# Patient Record
Sex: MALE | Race: WHITE | NOT HISPANIC OR LATINO | Employment: OTHER | URBAN - METROPOLITAN AREA
[De-identification: names, ages, dates, MRNs, and addresses within clinical notes are randomized per-mention and may not be internally consistent; named-entity substitution may affect disease eponyms.]

---

## 2017-06-08 ENCOUNTER — OFFICE VISIT (OUTPATIENT)
Dept: PAIN MEDICINE | Facility: CLINIC | Age: 67
End: 2017-06-08

## 2017-06-08 VITALS
HEIGHT: 72 IN | HEART RATE: 67 BPM | RESPIRATION RATE: 18 BRPM | OXYGEN SATURATION: 97 % | SYSTOLIC BLOOD PRESSURE: 121 MMHG | DIASTOLIC BLOOD PRESSURE: 81 MMHG | BODY MASS INDEX: 34.4 KG/M2 | WEIGHT: 254 LBS | TEMPERATURE: 97.5 F

## 2017-06-08 DIAGNOSIS — E66.8 MODERATE OBESITY: ICD-10-CM

## 2017-06-08 DIAGNOSIS — E66.8 MODERATE OBESITY: Primary | ICD-10-CM

## 2017-06-08 DIAGNOSIS — M47.816 LUMBAR FACET ARTHROPATHY: ICD-10-CM

## 2017-06-08 DIAGNOSIS — R53.81 PHYSICAL DECONDITIONING: ICD-10-CM

## 2017-06-08 DIAGNOSIS — G47.00 INSOMNIA, UNSPECIFIED TYPE: ICD-10-CM

## 2017-06-08 DIAGNOSIS — Z86.69 HX OF MIGRAINE HEADACHES: ICD-10-CM

## 2017-06-08 DIAGNOSIS — I10 ESSENTIAL HYPERTENSION: ICD-10-CM

## 2017-06-08 DIAGNOSIS — M96.1 POSTLAMINECTOMY SYNDROME OF LUMBAR REGION: ICD-10-CM

## 2017-06-08 PROCEDURE — 95972 ALYS CPLX SP/PN NPGT W/PRGRM: CPT | Performed by: ANESTHESIOLOGY

## 2017-06-08 PROCEDURE — 99214 OFFICE O/P EST MOD 30 MIN: CPT | Performed by: ANESTHESIOLOGY

## 2017-06-08 RX ORDER — AMLODIPINE BESYLATE 10 MG/1
TABLET ORAL
Refills: 3 | COMMUNITY
Start: 2017-05-04

## 2017-06-08 RX ORDER — TRAZODONE HYDROCHLORIDE 50 MG/1
TABLET ORAL
Refills: 2 | COMMUNITY
Start: 2017-05-10

## 2017-06-08 RX ORDER — MELOXICAM 7.5 MG/1
TABLET ORAL
Refills: 0 | COMMUNITY
Start: 2017-03-06 | End: 2017-06-08

## 2017-06-08 RX ORDER — ZOLPIDEM TARTRATE 10 MG/1
TABLET ORAL
Refills: 1 | COMMUNITY
Start: 2017-05-05 | End: 2017-06-08

## 2017-06-08 NOTE — PROGRESS NOTES
"Chief Complaint: \"Minor pain in my lower back down my left leg.\"    Brief History: Mr. Williams Lora is a 66 y.o. male, who underwent implantation of a spinal cord stimulator device with Dr. Ratliff on 09/26/2016 with [employer] Penta paddle lead (Full Body MRI compatible)   with the top electrodes projecting at the level of the superior endplate of the T7 vertebral level. IPG: St. Bert Proclaim 5 IPG Non-Rechargeable (Full Body MRI compatible)  (MRI compatible full body). Patient remained afebrile and surgical wounds are well healed and without erythema, drainage, or fluid accumulation. The device was implanted primarily for treatment of lower back and bilateral lower extremity pain.  Patient returns to the clinic for evaluation of and possible spinal cord stimulator reprogramming.   Mr. Williams Lora reports 70% relief along with remarkable functional improvement with the use of his stimulator device. Patient reports significant relief of his previously severe neurogenic claudication.   Pain level is rated as 4/10 with the stimulator \"turned on.”  Patient level ranges from 2/10 to 8/10 (with extreme activities) with the use of the spinal cord stimulator device.    Patient denies  numbness and weakness in the lower extremities.   Patient denies  any new bladder or bowel problems.   In terms of current analgesics, Williams Lora takes: Percocet 7.5/325 PRN   I have reviewed [facility] Report #[id number] consistent to medication reconciliation.    Review of New Diagnostic Studies:    MRI THORACIC SPINE WO CONTRAST - 08/31/2016. IMPRESSION:  1. Mild superior endplate compression deformity of T6. No evidence of acute thoracic spine trauma or significant focal subluxation.  2. Multilevel posterior element hypertrophy, with moderate thoracic canal stenosis at least, at T10-11, mild canal stenosis at T9-10, and milder degenerative changes elsewhere as discussed above.  3. Generalized ectasia of the descending " "thoracic aorta, where visible, measuring between 3 and 3.5 cm. At some point, follow-up scanning of the chest and abdomen should be considered to evaluate for possible greater degree of dilatation or focal aneurysm.      Previous Diagnostic Studies:   MRI Lumbar w/o contrast, 01/19/2016:L1-L2, degenerative facet changes noted without canal or foraminal stenosis. L2-L3, mild annular bulging with facet lamina flavum hypertrophy. L3-L4, moderate annular bulging with moderate facet and ligamentum flavum hypertrophy. Mild central canal narrowing, moderate foraminal stenosis with displacement of the nerve root superiorly in the foramen. L4-L5, moderate foraminal narrowing. L5-S1 minimal and lateral annular bulging, moderate to advanced facet hypertrophy. Moderate to severe right and moderate left foraminal stenosis. No central canal stenosis.  X-ray Lumbar, 11/03/2015: No fractures identified. Posterior fusion at L4-L5 level with discectomy as noted. Degenerative spur formations identified throughout lumbar spine. Alignment is normal.     The following portions of the patient's history were reviewed and updated as appropriate: problem list, past medical history, past surgery history, social history, family history, medications, and allergies    Review of Systems   Musculoskeletal: Positive for back pain and myalgias.   All other systems reviewed and are negative.    /81 (BP Location: Left arm, Patient Position: Sitting)  Pulse 67  Temp 97.5 °F (36.4 °C)  Resp 18  Ht 72\" (182.9 cm)  Wt 254 lb (115 kg)  SpO2 97%  BMI 34.45 kg/m2      Physical Exam   Neurologic Exam  Constitutional: He is oriented to person, place, and time. Vital signs are normal. He appears well-developed and well-nourished.   HENT:    Head: Normocephalic and atraumatic.   Eyes: Conjunctivae and lids are normal.   Neck: Trachea normal. Neck supple. No JVD present.   Cardiovascular: Normal rate, regular rhythm and normal heart sounds. "    Pulses:  Femoral pulses are 2+ on the right side, and 1+ on the left side.  Dorsalis pedis pulses are 1+ on the right side, and 1+ on the left side.    Posterior tibial pulses are 1+ on the right side, and 1+ on the left side.   Pulmonary/Chest: Effort normal and breath sounds normal.   Abdominal: Soft. Normal appearance and bowel sounds are normal. There is no tenderness.   Musculoskeletal:   Right hip: He exhibits normal range of motion, normal strength and no tenderness.   Left hip: Normal. He exhibits normal range of motion, normal strength and no tenderness.   Lumbar back: He exhibits tenderness. He exhibits no pain. Decreased range of motion: facet maneuvers postive above and below fusion.   Mike and Gaenslen's tests are negative  Piriformis maneuvers are negative   Lymphadenopathy:   Right: No inguinal adenopathy present.   Left: No inguinal adenopathy present.   Neurological: He is alert and oriented to person, place, and time. He has normal strength. He displays no atrophy and no tremor. No cranial nerve deficit or sensory deficit. He exhibits normal muscle tone. He displays a negative Romberg sign. Coordination normal. He displays no Babinski's sign on the right side. He displays no Babinski's sign on the left side.    Reflex Scores:  Patellar reflexes are 1+ on the right side and 0 on the left side.  Achilles reflexes are 0 on the right side and 0 on the left side.  FSS, negative, SLR: Positive. Negative long track signs   Skin: Skin is warm and intact. No cyanosis. Both surgical wounds are well healed with complete epithelization and without erythema, drainage or fluid accumulation  Psychiatric: He has a normal mood and affect. His speech is normal and behavior is normal. Judgment and thought content normal. Cognition and memory are normal.      PROCEDURE: Analysis of the spinal cord stimulator device with complex spinal cord stimulator reprogramming   Patient used the [employer] Jude spinal cord  stimulator device for 5424 lifetime hours (average 24 hours per day). Analysis of impedence reveals normal impedence for all contacts. The spinal cord stimulator device was reprogrammed under my direct supervision and two programs were created by adjusting electrode polarities, amplitude, pulse width, pulse rate, BurstDR, for a total of 2 programs, in the following fashion;  Program ONE (Best Program):    Electrode polarities: 3+, 4-, 5+  Amplitude: 0.35-0.5 mA     Pulse width: 1000 mcs  Rate: 40 Hz  IntraBurst rate: 500 Hz  Micro-dosin:25     Patient experienced significant pain relief with coverage with pleasant paresthesia in all areas of chronic pain during tonic stimulation.  Time spent reprogramming: 15 minutes  A copy of the telemetry report will be scanned in the patient's chart.    ASSESSMENT:   1. Postlaminectomy syndrome of lumbar region    2. Lumbar facet arthropathy    3. Hx of migraine headaches    4. Moderate obesity    5. Insomnia, unspecified type    6. Essential hypertension    7. Physical deconditioning      PLAN:  Patient's chronic pain condition is significantly improved since implantation of his SCS device. Continue current management and any additional workup, referrals, and treatments as outlined in the following plan:  1. Follow-up in 12 weeks  2. Long-term rehabilitation efforts:  A. Start a comprehensive physical therapy program for reconditioning, water therapy, core strengthening, gait and balance training, neurodynamics, myofascial release, cupping and dry needling at [facility]  B. Referral to [facility]  C. Start independent exercise program such as daily walks for fitness  3.  Pharmacological measures: Patient takes oxycodone/APAP as needed prescribed by his PCP   4. The patient has been instructed to contact my office with any questions or difficulties. The patient understands the plan and agrees to proceed accordingly.       Patient Care  Team:  Juanis Carreon MD as PCP - General (Family Medicine)  Ramiro Cordova MD as Consulting Physician (Cardiology)  Dany Ratliff MD as Surgeon (Neurosurgery)  Glenroy Cramer PA-C as Physician Assistant (Neurosurgery)  Hiram Hair MD as Consulting Physician (Pain Medicine)     No orders of the defined types were placed in this encounter.        No future appointments.      Hiram Hair MD    EMR Dragon/Transcription disclaimer:  Much of this encounter note is an electronic transcription of spoken language to printed text. Electronic transcription of spoken language may permit erroneous, or at times, nonsensical words or phrases to be inadvertently transcribed. Although I have reviewed the note for such errors, some may still exist.

## 2017-06-13 ENCOUNTER — HOSPITAL ENCOUNTER (OUTPATIENT)
Dept: NUTRITION | Facility: HOSPITAL | Age: 67
Discharge: HOME | End: 2017-06-13
Admitting: ANESTHESIOLOGY

## 2017-06-13 PROCEDURE — 97802 MEDICAL NUTRITION INDIV IN: CPT

## 2017-06-28 ENCOUNTER — APPOINTMENT (OUTPATIENT)
Dept: CT IMAGING | Facility: HOSPITAL | Age: 67
End: 2017-06-28

## 2017-06-28 ENCOUNTER — HOSPITAL ENCOUNTER (EMERGENCY)
Facility: HOSPITAL | Age: 67
Discharge: HOME | End: 2017-06-28
Attending: EMERGENCY MEDICINE | Admitting: EMERGENCY MEDICINE

## 2017-06-28 ENCOUNTER — APPOINTMENT (OUTPATIENT)
Dept: GENERAL RADIOLOGY | Facility: HOSPITAL | Age: 67
End: 2017-06-28

## 2017-06-28 VITALS
SYSTOLIC BLOOD PRESSURE: 147 MMHG | RESPIRATION RATE: 16 BRPM | HEART RATE: 79 BPM | BODY MASS INDEX: 33.86 KG/M2 | HEIGHT: 72 IN | WEIGHT: 250 LBS | DIASTOLIC BLOOD PRESSURE: 96 MMHG | OXYGEN SATURATION: 95 % | TEMPERATURE: 98 F

## 2017-06-28 DIAGNOSIS — R07.9 CHEST PAIN, UNSPECIFIED: Primary | ICD-10-CM

## 2017-06-28 LAB
ALBUMIN SERPL-MCNC: 4.6 G/DL (ref 3.4–4.8)
ALBUMIN/GLOB SERPL: 1.5 G/DL (ref 1.5–2.5)
ALP SERPL-CCNC: 70 U/L (ref 40–129)
ALT SERPL W P-5'-P-CCNC: 45 U/L (ref 10–44)
ANION GAP SERPL CALCULATED.3IONS-SCNC: 3.2 MMOL/L (ref 3.6–11.2)
AST SERPL-CCNC: 36 U/L (ref 10–34)
BASOPHILS # BLD AUTO: 0.06 10*3/MM3 (ref 0–0.3)
BASOPHILS NFR BLD AUTO: 0.8 % (ref 0–2)
BILIRUB SERPL-MCNC: 0.5 MG/DL (ref 0.2–1.8)
BILIRUB UR QL STRIP: NEGATIVE
BUN BLD-MCNC: 15 MG/DL (ref 7–21)
BUN/CREAT SERPL: 12.1 (ref 7–25)
CALCIUM SPEC-SCNC: 10.1 MG/DL (ref 7.7–10)
CHLORIDE SERPL-SCNC: 111 MMOL/L (ref 99–112)
CK MB SERPL-CCNC: 0.93 NG/ML (ref 0–5)
CLARITY UR: CLEAR
CO2 SERPL-SCNC: 31.8 MMOL/L (ref 24.3–31.9)
COLOR UR: YELLOW
CREAT BLD-MCNC: 1.24 MG/DL (ref 0.43–1.29)
DEPRECATED RDW RBC AUTO: 41.3 FL (ref 37–54)
EOSINOPHIL # BLD AUTO: 0.17 10*3/MM3 (ref 0–0.7)
EOSINOPHIL NFR BLD AUTO: 2.2 % (ref 0–7)
ERYTHROCYTE [DISTWIDTH] IN BLOOD BY AUTOMATED COUNT: 13.2 % (ref 11.5–14.5)
GFR SERPL CREATININE-BSD FRML MDRD: 58 ML/MIN/1.73
GLOBULIN UR ELPH-MCNC: 3.1 GM/DL
GLUCOSE BLD-MCNC: 126 MG/DL (ref 70–110)
GLUCOSE UR STRIP-MCNC: NEGATIVE MG/DL
HCT VFR BLD AUTO: 43.2 % (ref 42–52)
HGB BLD-MCNC: 14.2 G/DL (ref 14–18)
HGB UR QL STRIP.AUTO: NEGATIVE
IMM GRANULOCYTES # BLD: 0.02 10*3/MM3 (ref 0–0.03)
IMM GRANULOCYTES NFR BLD: 0.3 % (ref 0–0.5)
KETONES UR QL STRIP: NEGATIVE
LEUKOCYTE ESTERASE UR QL STRIP.AUTO: NEGATIVE
LYMPHOCYTES # BLD AUTO: 1.99 10*3/MM3 (ref 1–3)
LYMPHOCYTES NFR BLD AUTO: 25.7 % (ref 16–46)
MCH RBC QN AUTO: 28.3 PG (ref 27–33)
MCHC RBC AUTO-ENTMCNC: 32.9 G/DL (ref 33–37)
MCV RBC AUTO: 86.1 FL (ref 80–94)
MONOCYTES # BLD AUTO: 1 10*3/MM3 (ref 0.1–0.9)
MONOCYTES NFR BLD AUTO: 12.9 % (ref 0–12)
MYOGLOBIN SERPL-MCNC: 55 NG/ML (ref 0–109)
NEUTROPHILS # BLD AUTO: 4.5 10*3/MM3 (ref 1.4–6.5)
NEUTROPHILS NFR BLD AUTO: 58.1 % (ref 40–75)
NITRITE UR QL STRIP: NEGATIVE
OSMOLALITY SERPL CALC.SUM OF ELEC: 292.9 MOSM/KG (ref 273–305)
PH UR STRIP.AUTO: 5.5 [PH] (ref 5–8)
PLATELET # BLD AUTO: 197 10*3/MM3 (ref 130–400)
PMV BLD AUTO: 10 FL (ref 6–10)
POTASSIUM BLD-SCNC: 4.5 MMOL/L (ref 3.5–5.3)
PROT SERPL-MCNC: 7.7 G/DL (ref 6–8)
PROT UR QL STRIP: NEGATIVE
RBC # BLD AUTO: 5.02 10*6/MM3 (ref 4.7–6.1)
SODIUM BLD-SCNC: 146 MMOL/L (ref 135–153)
SP GR UR STRIP: 1.02 (ref 1–1.03)
TROPONIN I SERPL-MCNC: <0.006 NG/ML
TROPONIN I SERPL-MCNC: <0.006 NG/ML
UROBILINOGEN UR QL STRIP: NORMAL
WBC NRBC COR # BLD: 7.74 10*3/MM3 (ref 4.5–12.5)

## 2017-06-28 PROCEDURE — 71010 XR CHEST 1 VW: CPT | Performed by: RADIOLOGY

## 2017-06-28 PROCEDURE — 82553 CREATINE MB FRACTION: CPT | Performed by: PHYSICIAN ASSISTANT

## 2017-06-28 PROCEDURE — 80053 COMPREHEN METABOLIC PANEL: CPT | Performed by: PHYSICIAN ASSISTANT

## 2017-06-28 PROCEDURE — 36415 COLL VENOUS BLD VENIPUNCTURE: CPT

## 2017-06-28 PROCEDURE — 93005 ELECTROCARDIOGRAM TRACING: CPT | Performed by: EMERGENCY MEDICINE

## 2017-06-28 PROCEDURE — 93010 ELECTROCARDIOGRAM REPORT: CPT | Performed by: INTERNAL MEDICINE

## 2017-06-28 PROCEDURE — 71010 HC CHEST PA OR AP: CPT

## 2017-06-28 PROCEDURE — 84484 ASSAY OF TROPONIN QUANT: CPT | Performed by: PHYSICIAN ASSISTANT

## 2017-06-28 PROCEDURE — 85025 COMPLETE CBC W/AUTO DIFF WBC: CPT | Performed by: PHYSICIAN ASSISTANT

## 2017-06-28 PROCEDURE — 83874 ASSAY OF MYOGLOBIN: CPT | Performed by: PHYSICIAN ASSISTANT

## 2017-06-28 PROCEDURE — 81003 URINALYSIS AUTO W/O SCOPE: CPT | Performed by: PHYSICIAN ASSISTANT

## 2017-06-28 PROCEDURE — 99285 EMERGENCY DEPT VISIT HI MDM: CPT

## 2017-06-28 PROCEDURE — 0 IOPAMIDOL PER 1 ML: Performed by: EMERGENCY MEDICINE

## 2017-06-28 PROCEDURE — 71275 CT ANGIOGRAPHY CHEST: CPT

## 2017-06-28 PROCEDURE — 71275 CT ANGIOGRAPHY CHEST: CPT | Performed by: RADIOLOGY

## 2017-06-28 RX ORDER — CHLORAL HYDRATE 500 MG
CAPSULE ORAL
COMMUNITY
End: 2017-09-28 | Stop reason: ALTCHOICE

## 2017-06-28 RX ORDER — ASPIRIN 81 MG/1
324 TABLET, CHEWABLE ORAL ONCE
Status: COMPLETED | OUTPATIENT
Start: 2017-06-28 | End: 2017-06-28

## 2017-06-28 RX ORDER — NITROGLYCERIN 0.4 MG/1
0.4 TABLET SUBLINGUAL
Status: COMPLETED | OUTPATIENT
Start: 2017-06-28 | End: 2017-06-28

## 2017-06-28 RX ORDER — SODIUM CHLORIDE 0.9 % (FLUSH) 0.9 %
10 SYRINGE (ML) INJECTION AS NEEDED
Status: DISCONTINUED | OUTPATIENT
Start: 2017-06-28 | End: 2017-06-29 | Stop reason: HOSPADM

## 2017-06-28 RX ADMIN — IOPAMIDOL 90 ML: 755 INJECTION, SOLUTION INTRAVENOUS at 19:53

## 2017-06-28 RX ADMIN — NITROGLYCERIN 0.4 MG: 0.4 TABLET SUBLINGUAL at 18:56

## 2017-06-28 RX ADMIN — NITROGLYCERIN 0.4 MG: 0.4 TABLET SUBLINGUAL at 19:01

## 2017-06-28 RX ADMIN — ASPIRIN 324 MG: 81 TABLET, CHEWABLE ORAL at 18:49

## 2017-06-28 RX ADMIN — NITROGLYCERIN 0.4 MG: 0.4 TABLET SUBLINGUAL at 18:51

## 2017-06-29 ENCOUNTER — TRANSCRIBE ORDERS (OUTPATIENT)
Dept: ADMINISTRATIVE | Facility: HOSPITAL | Age: 67
End: 2017-06-29

## 2017-06-29 DIAGNOSIS — R07.9 CHEST PAIN, UNSPECIFIED: Primary | ICD-10-CM

## 2017-06-30 ENCOUNTER — HOSPITAL ENCOUNTER (OUTPATIENT)
Dept: CARDIOLOGY | Facility: HOSPITAL | Age: 67
Discharge: HOME | End: 2017-06-30

## 2017-06-30 ENCOUNTER — HOSPITAL ENCOUNTER (EMERGENCY)
Facility: HOSPITAL | Age: 67
Discharge: HOME | End: 2017-06-30
Attending: EMERGENCY MEDICINE | Admitting: EMERGENCY MEDICINE

## 2017-06-30 ENCOUNTER — APPOINTMENT (OUTPATIENT)
Dept: CT IMAGING | Facility: HOSPITAL | Age: 67
End: 2017-06-30

## 2017-06-30 VITALS
WEIGHT: 255 LBS | DIASTOLIC BLOOD PRESSURE: 98 MMHG | OXYGEN SATURATION: 99 % | HEIGHT: 72 IN | TEMPERATURE: 98 F | BODY MASS INDEX: 34.54 KG/M2 | SYSTOLIC BLOOD PRESSURE: 146 MMHG | HEART RATE: 99 BPM | RESPIRATION RATE: 16 BRPM

## 2017-06-30 DIAGNOSIS — M50.30 DDD (DEGENERATIVE DISC DISEASE), CERVICAL: Primary | ICD-10-CM

## 2017-06-30 DIAGNOSIS — R07.9 CHEST PAIN, UNSPECIFIED: ICD-10-CM

## 2017-06-30 DIAGNOSIS — M54.2 NECK PAIN ON LEFT SIDE: ICD-10-CM

## 2017-06-30 DIAGNOSIS — M25.512 ACUTE PAIN OF LEFT SHOULDER: ICD-10-CM

## 2017-06-30 LAB — TROPONIN I SERPL-MCNC: <0.006 NG/ML

## 2017-06-30 PROCEDURE — 72125 CT NECK SPINE W/O DYE: CPT | Performed by: RADIOLOGY

## 2017-06-30 PROCEDURE — 99283 EMERGENCY DEPT VISIT LOW MDM: CPT

## 2017-06-30 PROCEDURE — 93010 ELECTROCARDIOGRAM REPORT: CPT | Performed by: INTERNAL MEDICINE

## 2017-06-30 PROCEDURE — 84484 ASSAY OF TROPONIN QUANT: CPT | Performed by: EMERGENCY MEDICINE

## 2017-06-30 PROCEDURE — 72125 CT NECK SPINE W/O DYE: CPT

## 2017-06-30 PROCEDURE — 93005 ELECTROCARDIOGRAM TRACING: CPT | Performed by: EMERGENCY MEDICINE

## 2017-06-30 RX ORDER — OXYCODONE AND ACETAMINOPHEN 10; 325 MG/1; MG/1
1 TABLET ORAL ONCE
Status: COMPLETED | OUTPATIENT
Start: 2017-06-30 | End: 2017-06-30

## 2017-06-30 RX ORDER — OXYCODONE HYDROCHLORIDE AND ACETAMINOPHEN 5; 325 MG/1; MG/1
1 TABLET ORAL EVERY 8 HOURS PRN
Qty: 10 TABLET | Refills: 0 | Status: SHIPPED | OUTPATIENT
Start: 2017-06-30 | End: 2017-09-28 | Stop reason: ALTCHOICE

## 2017-06-30 RX ORDER — ONDANSETRON 4 MG/1
4 TABLET, ORALLY DISINTEGRATING ORAL ONCE
Status: COMPLETED | OUTPATIENT
Start: 2017-06-30 | End: 2017-06-30

## 2017-06-30 RX ORDER — SODIUM CHLORIDE 0.9 % (FLUSH) 0.9 %
10 SYRINGE (ML) INJECTION AS NEEDED
Status: DISCONTINUED | OUTPATIENT
Start: 2017-06-30 | End: 2017-06-30 | Stop reason: HOSPADM

## 2017-06-30 RX ORDER — CYCLOBENZAPRINE HCL 10 MG
10 TABLET ORAL 3 TIMES DAILY PRN
Qty: 20 TABLET | Refills: 0 | Status: SHIPPED | OUTPATIENT
Start: 2017-06-30 | End: 2017-09-28 | Stop reason: ALTCHOICE

## 2017-06-30 RX ADMIN — OXYCODONE HYDROCHLORIDE AND ACETAMINOPHEN 1 TABLET: 10; 325 TABLET ORAL at 16:45

## 2017-06-30 RX ADMIN — ONDANSETRON 4 MG: 4 TABLET, ORALLY DISINTEGRATING ORAL at 16:45

## 2017-07-01 NOTE — ED PROVIDER NOTES
Subjective   Patient is a 67 y.o. male presenting with neck pain.   Neck Pain   Pain location:  L side  Quality:  Aching  Pain radiates to:  L shoulder and L arm  Pain severity:  Severe  Pain is:  Same all the time  Onset quality:  Gradual  Timing:  Constant  Progression:  Worsening  Chronicity:  New  Context: not fall, not jumping from heights, not lifting a heavy object, not MCA, not MVC, not pedestrian accident and not recent injury    Relieved by:  Nothing  Worsened by:  Position  Ineffective treatments:  None tried  Associated symptoms: no bladder incontinence, no bowel incontinence, no chest pain, no fever, no headaches, no leg pain, no numbness, no paresis, no photophobia, no syncope, no tingling, no visual change, no weakness and no weight loss    Risk factors: no hx of head and neck radiation, no hx of osteoporosis and no hx of spinal trauma        Review of Systems   Constitutional: Negative for activity change, appetite change, chills, diaphoresis, fatigue, fever and weight loss.   HENT: Negative for congestion, ear pain and sore throat.    Eyes: Negative for photophobia and redness.   Respiratory: Negative for cough, chest tightness, shortness of breath and wheezing.    Cardiovascular: Negative for chest pain, palpitations, leg swelling and syncope.   Gastrointestinal: Negative for abdominal pain, bowel incontinence, diarrhea, nausea and vomiting.   Genitourinary: Negative for bladder incontinence, dysuria and urgency.   Musculoskeletal: Positive for myalgias and neck pain. Negative for arthralgias and back pain.   Skin: Negative for pallor, rash and wound.   Neurological: Negative for dizziness, tingling, speech difficulty, weakness, numbness and headaches.   Psychiatric/Behavioral: Negative for agitation, behavioral problems, confusion and decreased concentration.       Past Medical History:   Diagnosis Date   • Arthritis    • Chronic pain disorder    • Depression     RECENT DEATH OF DAUGHTER   •  Extremity pain    • Grief     Sudden loss of daughter in law 4 weeks ago   • Headache    • History of gastritis    • History of gout    • History of kidney stones    • Hypertension    • Low back pain    • Lumbosacral disc disease    • Migraines    • Rheumatic fever    • Wears glasses        No Known Allergies    Past Surgical History:   Procedure Laterality Date   • CHOLECYSTECTOMY  2001   • COLONOSCOPY     • CYSTOSCOPY W/ LITHOLAPAXY / E[facility]  2004   • LUMBAR FUSION  10/2013    posterior lumbar interbody fusion, L4-L5, Dr. Raad Rivera   • SPINAL CORD STIMULATOR IMPLANT  07/18/2016   • SPINAL CORD STIMULATOR IMPLANT N/A 9/26/2016    Procedure: SPINAL CORD STIMULATOR INSERTION PHASE 1;  Surgeon: Dany Ratliff MD;  Location: [facility];  Service:        Family History   Problem Relation Age of Onset   • Heart disease Mother    • Pulmonary fibrosis Father        Social History     Social History   • Marital status:      Spouse name: N/A   • Number of children: N/A   • Years of education: N/A     Social History Main Topics   • Smoking status: Former Smoker     Years: 20.00     Types: Cigarettes     Quit date: 8/19/1996   • Smokeless tobacco: Never Used   • Alcohol use No      Comment: Rarely   • Drug use: No   • Sexual activity: Defer     Other Topics Concern   • None     Social History Narrative           Objective   Physical Exam   Constitutional: He is oriented to person, place, and time. He appears well-developed and well-nourished.  Non-toxic appearance. No distress.   HENT:   Head: Normocephalic and atraumatic.   Right Ear: External ear normal.   Left Ear: External ear normal.   Nose: Nose normal.   Mouth/Throat: Oropharynx is clear and moist and mucous membranes are normal. No oropharyngeal exudate. No tonsillar exudate.   Eyes: Conjunctivae, EOM and lids are normal. Pupils are equal, round, and reactive to light.   Neck: Normal range of motion and full passive range of motion without pain. Neck supple. No  thyromegaly present.   Cardiovascular: Normal rate, regular rhythm, S1 normal, S2 normal, normal heart sounds, intact distal pulses and normal pulses.    Pulmonary/Chest: Effort normal and breath sounds normal. No tachypnea. No respiratory distress. He has no decreased breath sounds. He has no wheezes. He has no rales. He exhibits no tenderness.   Abdominal: Soft. Normal appearance and bowel sounds are normal. He exhibits no distension. There is no tenderness. There is no rebound and no guarding.   Musculoskeletal: He exhibits tenderness. He exhibits no edema or deformity.        Left shoulder: He exhibits decreased range of motion, tenderness and bony tenderness.        Cervical back: He exhibits tenderness.   Lymphadenopathy:     He has no cervical adenopathy.   Neurological: He is alert and oriented to person, place, and time. He has normal strength. No cranial nerve deficit or sensory deficit. GCS eye subscore is 4. GCS verbal subscore is 5. GCS motor subscore is 6.   Skin: Skin is warm, dry and intact. No rash noted. He is not diaphoretic. No erythema. No pallor.   Psychiatric: He has a normal mood and affect. His speech is normal and behavior is normal. Judgment and thought content normal. Cognition and memory are normal.   Nursing note and vitals reviewed.      Procedures         ED Course  ED Course   Value Comment By Time   CT Cervical Spine Without Contrast IMPRESSION:  There is arthritic change, but no acute bony abnormality is seen Juan A Black MD 06/30 1619   ECG 12 Lead Normal Sinus Rhythm.  No Acute Ischemia. Juan A Black MD 07/01 0031                  MDM  Number of Diagnoses or Management Options  Acute pain of left shoulder: new and requires workup  DDD (degenerative disc disease), cervical: new and requires workup  Neck pain on left side: new and requires workup     Amount and/or Complexity of Data Reviewed  Tests in the radiology section of CPT®: ordered and  reviewed  Independent visualization of images, tracings, or specimens: yes    Risk of Complications, Morbidity, and/or Mortality  Presenting problems: moderate  Diagnostic procedures: moderate  Management options: moderate    Patient Progress  Patient progress: stable      Final diagnoses:   Neck pain on left side   Acute pain of left shoulder   DDD (degenerative disc disease), cervical            Juan A Terrance Black MD  07/01/17 0035

## 2017-08-08 ENCOUNTER — APPOINTMENT (OUTPATIENT)
Dept: NUCLEAR MEDICINE | Facility: HOSPITAL | Age: 67
End: 2017-08-08

## 2017-08-08 ENCOUNTER — HOSPITAL ENCOUNTER (EMERGENCY)
Facility: HOSPITAL | Age: 67
Discharge: HOME | End: 2017-08-09
Attending: EMERGENCY MEDICINE | Admitting: EMERGENCY MEDICINE

## 2017-08-08 ENCOUNTER — APPOINTMENT (OUTPATIENT)
Dept: GENERAL RADIOLOGY | Facility: HOSPITAL | Age: 67
End: 2017-08-08

## 2017-08-08 DIAGNOSIS — T50.905A MEDICATION REACTION, INITIAL ENCOUNTER: Primary | ICD-10-CM

## 2017-08-08 DIAGNOSIS — J20.9 ACUTE BRONCHITIS, UNSPECIFIED ORGANISM: ICD-10-CM

## 2017-08-08 LAB
6-ACETYL MORPHINE: NEGATIVE
ALBUMIN SERPL-MCNC: 4.6 G/DL (ref 3.4–4.8)
ALBUMIN/GLOB SERPL: 1.3 G/DL (ref 1.5–2.5)
ALP SERPL-CCNC: 62 U/L (ref 40–129)
ALT SERPL W P-5'-P-CCNC: 32 U/L (ref 10–44)
AMPHET+METHAMPHET UR QL: NEGATIVE
AMYLASE SERPL-CCNC: 936 U/L (ref 28–100)
ANION GAP SERPL CALCULATED.3IONS-SCNC: 9.2 MMOL/L (ref 3.6–11.2)
AST SERPL-CCNC: 29 U/L (ref 10–34)
BACTERIA UR QL AUTO: ABNORMAL /HPF
BARBITURATES UR QL SCN: NEGATIVE
BASOPHILS # BLD AUTO: 0.03 10*3/MM3 (ref 0–0.3)
BASOPHILS NFR BLD AUTO: 0.3 % (ref 0–2)
BENZODIAZ UR QL SCN: NEGATIVE
BILIRUB SERPL-MCNC: 0.6 MG/DL (ref 0.2–1.8)
BILIRUB UR QL STRIP: NEGATIVE
BUN BLD-MCNC: 13 MG/DL (ref 7–21)
BUN/CREAT SERPL: 9.4 (ref 7–25)
BUPRENORPHINE SERPL-MCNC: NEGATIVE NG/ML
CALCIUM SPEC-SCNC: 10.4 MG/DL (ref 7.7–10)
CANNABINOIDS SERPL QL: NEGATIVE
CHLORIDE SERPL-SCNC: 107 MMOL/L (ref 99–112)
CLARITY UR: CLEAR
CO2 SERPL-SCNC: 22.8 MMOL/L (ref 24.3–31.9)
COCAINE UR QL: NEGATIVE
COLOR UR: YELLOW
CREAT BLD-MCNC: 1.39 MG/DL (ref 0.43–1.29)
D DIMER PPP FEU-MCNC: 0.69 MCGFEU/ML (ref 0–0.5)
DEPRECATED RDW RBC AUTO: 40.2 FL (ref 37–54)
EOSINOPHIL # BLD AUTO: 0.14 10*3/MM3 (ref 0–0.7)
EOSINOPHIL NFR BLD AUTO: 1.5 % (ref 0–7)
ERYTHROCYTE [DISTWIDTH] IN BLOOD BY AUTOMATED COUNT: 13 % (ref 11.5–14.5)
GFR SERPL CREATININE-BSD FRML MDRD: 51 ML/MIN/1.73
GLOBULIN UR ELPH-MCNC: 3.5 GM/DL
GLUCOSE BLD-MCNC: 203 MG/DL (ref 70–110)
GLUCOSE UR STRIP-MCNC: NEGATIVE MG/DL
HCT VFR BLD AUTO: 44.9 % (ref 42–52)
HGB BLD-MCNC: 15.1 G/DL (ref 14–18)
HGB UR QL STRIP.AUTO: ABNORMAL
HYALINE CASTS UR QL AUTO: ABNORMAL /LPF
IMM GRANULOCYTES # BLD: 0.02 10*3/MM3 (ref 0–0.03)
IMM GRANULOCYTES NFR BLD: 0.2 % (ref 0–0.5)
KETONES UR QL STRIP: NEGATIVE
LEUKOCYTE ESTERASE UR QL STRIP.AUTO: NEGATIVE
LIPASE SERPL-CCNC: 20 U/L (ref 13–60)
LYMPHOCYTES # BLD AUTO: 2.18 10*3/MM3 (ref 1–3)
LYMPHOCYTES NFR BLD AUTO: 23.4 % (ref 16–46)
MCH RBC QN AUTO: 28.4 PG (ref 27–33)
MCHC RBC AUTO-ENTMCNC: 33.6 G/DL (ref 33–37)
MCV RBC AUTO: 84.4 FL (ref 80–94)
METHADONE UR QL SCN: NEGATIVE
MONOCYTES # BLD AUTO: 0.78 10*3/MM3 (ref 0.1–0.9)
MONOCYTES NFR BLD AUTO: 8.4 % (ref 0–12)
NEUTROPHILS # BLD AUTO: 6.16 10*3/MM3 (ref 1.4–6.5)
NEUTROPHILS NFR BLD AUTO: 66.2 % (ref 40–75)
NITRITE UR QL STRIP: NEGATIVE
OPIATES UR QL: NEGATIVE
OSMOLALITY SERPL CALC.SUM OF ELEC: 283.5 MOSM/KG (ref 273–305)
OXYCODONE UR QL SCN: POSITIVE
PCP UR QL SCN: NEGATIVE
PH UR STRIP.AUTO: 6.5 [PH] (ref 5–8)
PLATELET # BLD AUTO: 218 10*3/MM3 (ref 130–400)
PMV BLD AUTO: 10.5 FL (ref 6–10)
POTASSIUM BLD-SCNC: 3.8 MMOL/L (ref 3.5–5.3)
PROT SERPL-MCNC: 8.1 G/DL (ref 6–8)
PROT UR QL STRIP: NEGATIVE
RBC # BLD AUTO: 5.32 10*6/MM3 (ref 4.7–6.1)
RBC # UR: ABNORMAL /HPF
REF LAB TEST METHOD: ABNORMAL
SODIUM BLD-SCNC: 139 MMOL/L (ref 135–153)
SP GR UR STRIP: 1.02 (ref 1–1.03)
SQUAMOUS #/AREA URNS HPF: ABNORMAL /HPF
TROPONIN I SERPL-MCNC: <0.006 NG/ML
UROBILINOGEN UR QL STRIP: ABNORMAL
WBC NRBC COR # BLD: 9.31 10*3/MM3 (ref 4.5–12.5)
WBC UR QL AUTO: ABNORMAL /HPF

## 2017-08-08 PROCEDURE — 80307 DRUG TEST PRSMV CHEM ANLYZR: CPT | Performed by: PHYSICIAN ASSISTANT

## 2017-08-08 PROCEDURE — 25010000002 ONDANSETRON PER 1 MG: Performed by: PHYSICIAN ASSISTANT

## 2017-08-08 PROCEDURE — 96374 THER/PROPH/DIAG INJ IV PUSH: CPT

## 2017-08-08 PROCEDURE — 94640 AIRWAY INHALATION TREATMENT: CPT

## 2017-08-08 PROCEDURE — 93005 ELECTROCARDIOGRAM TRACING: CPT | Performed by: EMERGENCY MEDICINE

## 2017-08-08 PROCEDURE — 78582 LUNG VENTILAT&PERFUS IMAGING: CPT

## 2017-08-08 PROCEDURE — 71010 XR CHEST 1 VW: CPT | Performed by: RADIOLOGY

## 2017-08-08 PROCEDURE — 94799 UNLISTED PULMONARY SVC/PX: CPT

## 2017-08-08 PROCEDURE — 82150 ASSAY OF AMYLASE: CPT | Performed by: PHYSICIAN ASSISTANT

## 2017-08-08 PROCEDURE — 96375 TX/PRO/DX INJ NEW DRUG ADDON: CPT

## 2017-08-08 PROCEDURE — 99283 EMERGENCY DEPT VISIT LOW MDM: CPT

## 2017-08-08 PROCEDURE — 71010 HC CHEST PA OR AP: CPT

## 2017-08-08 PROCEDURE — 78582 LUNG VENTILAT&PERFUS IMAGING: CPT | Performed by: RADIOLOGY

## 2017-08-08 PROCEDURE — 80053 COMPREHEN METABOLIC PANEL: CPT | Performed by: PHYSICIAN ASSISTANT

## 2017-08-08 PROCEDURE — 83690 ASSAY OF LIPASE: CPT | Performed by: PHYSICIAN ASSISTANT

## 2017-08-08 PROCEDURE — 84484 ASSAY OF TROPONIN QUANT: CPT | Performed by: PHYSICIAN ASSISTANT

## 2017-08-08 PROCEDURE — 25010000002 METHYLPREDNISOLONE PER 125 MG: Performed by: PHYSICIAN ASSISTANT

## 2017-08-08 PROCEDURE — 85379 FIBRIN DEGRADATION QUANT: CPT | Performed by: PHYSICIAN ASSISTANT

## 2017-08-08 PROCEDURE — 93010 ELECTROCARDIOGRAM REPORT: CPT | Performed by: INTERNAL MEDICINE

## 2017-08-08 PROCEDURE — 81001 URINALYSIS AUTO W/SCOPE: CPT | Performed by: PHYSICIAN ASSISTANT

## 2017-08-08 PROCEDURE — 85025 COMPLETE CBC W/AUTO DIFF WBC: CPT | Performed by: PHYSICIAN ASSISTANT

## 2017-08-08 RX ORDER — IPRATROPIUM BROMIDE AND ALBUTEROL SULFATE 2.5; .5 MG/3ML; MG/3ML
3 SOLUTION RESPIRATORY (INHALATION) ONCE
Status: COMPLETED | OUTPATIENT
Start: 2017-08-08 | End: 2017-08-08

## 2017-08-08 RX ORDER — METHYLPREDNISOLONE SODIUM SUCCINATE 125 MG/2ML
125 INJECTION, POWDER, LYOPHILIZED, FOR SOLUTION INTRAMUSCULAR; INTRAVENOUS ONCE
Status: COMPLETED | OUTPATIENT
Start: 2017-08-08 | End: 2017-08-08

## 2017-08-08 RX ORDER — ONDANSETRON 2 MG/ML
4 INJECTION INTRAMUSCULAR; INTRAVENOUS ONCE
Status: COMPLETED | OUTPATIENT
Start: 2017-08-08 | End: 2017-08-08

## 2017-08-08 RX ORDER — SODIUM CHLORIDE 0.9 % (FLUSH) 0.9 %
10 SYRINGE (ML) INJECTION AS NEEDED
Status: DISCONTINUED | OUTPATIENT
Start: 2017-08-08 | End: 2017-08-09 | Stop reason: HOSPADM

## 2017-08-08 RX ADMIN — METHYLPREDNISOLONE SODIUM SUCCINATE 125 MG: 125 INJECTION, POWDER, FOR SOLUTION INTRAMUSCULAR; INTRAVENOUS at 22:46

## 2017-08-08 RX ADMIN — IPRATROPIUM BROMIDE AND ALBUTEROL SULFATE 3 ML: .5; 3 SOLUTION RESPIRATORY (INHALATION) at 22:48

## 2017-08-08 RX ADMIN — ONDANSETRON 4 MG: 2 INJECTION, SOLUTION INTRAMUSCULAR; INTRAVENOUS at 22:53

## 2017-08-08 RX ADMIN — SODIUM CHLORIDE 500 ML: 9 INJECTION, SOLUTION INTRAVENOUS at 22:46

## 2017-08-09 VITALS
OXYGEN SATURATION: 97 % | HEART RATE: 81 BPM | DIASTOLIC BLOOD PRESSURE: 74 MMHG | WEIGHT: 254 LBS | RESPIRATION RATE: 18 BRPM | SYSTOLIC BLOOD PRESSURE: 134 MMHG | HEIGHT: 72 IN | BODY MASS INDEX: 34.4 KG/M2 | TEMPERATURE: 97.8 F

## 2017-08-09 PROCEDURE — 0 TECHNETIUM TC 99M PENTETATE KIT: Performed by: EMERGENCY MEDICINE

## 2017-08-09 PROCEDURE — 0 TECHNETIUM ALBUMIN AGGREGATED: Performed by: EMERGENCY MEDICINE

## 2017-08-09 PROCEDURE — 96375 TX/PRO/DX INJ NEW DRUG ADDON: CPT

## 2017-08-09 PROCEDURE — A9539 TC99M PENTETATE: HCPCS | Performed by: EMERGENCY MEDICINE

## 2017-08-09 PROCEDURE — A9540 TC99M MAA: HCPCS | Performed by: EMERGENCY MEDICINE

## 2017-08-09 RX ORDER — AMOXICILLIN AND CLAVULANATE POTASSIUM 875; 125 MG/1; MG/1
1 TABLET, FILM COATED ORAL 2 TIMES DAILY
Qty: 14 TABLET | Refills: 0 | Status: SHIPPED | OUTPATIENT
Start: 2017-08-09 | End: 2017-08-16

## 2017-08-09 RX ORDER — ALBUTEROL SULFATE 90 UG/1
2 AEROSOL, METERED RESPIRATORY (INHALATION) EVERY 6 HOURS PRN
Qty: 6.7 G | Refills: 0 | Status: SHIPPED | OUTPATIENT
Start: 2017-08-09 | End: 2017-08-12

## 2017-08-09 RX ORDER — ONDANSETRON 4 MG/1
4 TABLET, FILM COATED ORAL EVERY 8 HOURS PRN
Qty: 15 TABLET | Refills: 0 | Status: SHIPPED | OUTPATIENT
Start: 2017-08-09 | End: 2017-08-14

## 2017-08-09 RX ADMIN — Medication 1 DOSE: at 00:15

## 2017-08-09 RX ADMIN — KIT FOR THE PREPARATION OF TECHNETIUM TC 99M PENTETATE 1 DOSE: 20 INJECTION, POWDER, LYOPHILIZED, FOR SOLUTION INTRAVENOUS; RESPIRATORY (INHALATION) at 00:01

## 2017-08-09 NOTE — ED PROVIDER NOTES
Subjective   HPI Comments: This is a 67-year-old male patient who presents to the ER with a chief complaint of shortness of breath.  The patient says and his most recent medical history he is been dealing with an episode of shingles ×6 weeks.  Last night before going to bed he took 2-3 of his Percocet that he is prescribed for his chronic back pain, the Lyrica 150 mg prescribed for his shingles, and 3 trazodone for insomnia. He states that approximately 1.5 hours after taking those, he woke up from sleep experiencing shortness of breath and nausea. He battled that all night and day. Around dinner today, it improved but afterwards things worsened so he decided to come in. He denies chest pain, palpitations, vomiting, chest pain with deep breathing. He does endorse a non-productive cough and chills but no subjective fever. He has a history of smoking but quit 21 years ago. He has no cardiac or pulmonary history.     Patient is a 67 y.o. male presenting with shortness of breath.   Shortness of Breath   Severity:  Moderate  Onset quality:  Gradual  Duration:  12 hours  Timing:  Intermittent  Progression:  Worsening  Chronicity:  New  Context: activity    Relieved by:  None tried  Worsened by:  Activity, coughing, deep breathing, exertion and movement  Ineffective treatments:  None tried  Associated symptoms: cough    Associated symptoms: no abdominal pain, no chest pain, no diaphoresis, no ear pain, no fever, no headaches, no sore throat, no sputum production, no syncope, no vomiting and no wheezing    Risk factors: obesity        Review of Systems   Constitutional: Negative.  Negative for diaphoresis and fever.   HENT: Negative.  Negative for ear pain and sore throat.    Respiratory: Positive for cough and shortness of breath. Negative for sputum production and wheezing.    Cardiovascular: Negative.  Negative for chest pain, palpitations, leg swelling and syncope.   Gastrointestinal: Positive for nausea. Negative for  abdominal pain, constipation, diarrhea and vomiting.   Endocrine: Negative.    Genitourinary: Negative.  Negative for dysuria, frequency and urgency.   Skin: Negative.    Neurological: Negative.  Negative for dizziness, syncope, facial asymmetry, speech difficulty, weakness and headaches.   Psychiatric/Behavioral: Negative.    All other systems reviewed and are negative.      Past Medical History:   Diagnosis Date   • Arthritis    • Chronic pain disorder    • Depression     RECENT DEATH OF DAUGHTER   • Extremity pain    • Grief     Sudden loss of daughter in law 4 weeks ago   • Headache    • History of gastritis    • History of gout    • History of kidney stones    • Hypertension    • Low back pain    • Lumbosacral disc disease    • Migraines    • Rheumatic fever    • Wears glasses        No Known Allergies    Past Surgical History:   Procedure Laterality Date   • CHOLECYSTECTOMY  2001   • COLONOSCOPY     • CYSTOSCOPY W/ LITHOLAPAXY / EHL  2004   • LUMBAR FUSION  10/2013    posterior lumbar interbody fusion, L4-L5, Dr. Raad Rivera   • SPINAL CORD STIMULATOR IMPLANT  07/18/2016   • SPINAL CORD STIMULATOR IMPLANT N/A 9/26/2016    Procedure: SPINAL CORD STIMULATOR INSERTION PHASE 1;  Surgeon: Dany Ratliff MD;  Location: [facility];  Service:        Family History   Problem Relation Age of Onset   • Heart disease Mother    • Pulmonary fibrosis Father        Social History     Social History   • Marital status:      Spouse name: N/A   • Number of children: N/A   • Years of education: N/A     Social History Main Topics   • Smoking status: Former Smoker     Years: 20.00     Types: Cigarettes     Quit date: 8/19/1996   • Smokeless tobacco: Never Used   • Alcohol use No      Comment: Rarely   • Drug use: No   • Sexual activity: Defer     Other Topics Concern   • None     Social History Narrative           Objective   Physical Exam   Constitutional: He is oriented to person, place, and time. He appears  well-developed and well-nourished. No distress.   HENT:   Head: Normocephalic and atraumatic.   Right Ear: External ear normal.   Left Ear: External ear normal.   Nose: Nose normal.   Eyes: Conjunctivae and EOM are normal. Pupils are equal, round, and reactive to light.   Neck: Normal range of motion. Neck supple. No JVD present. No tracheal deviation present.   Cardiovascular: Normal rate, regular rhythm and normal heart sounds.    No murmur heard.  Pulmonary/Chest: Effort normal and breath sounds normal. No respiratory distress. He has no wheezes.   Abdominal: Soft. Bowel sounds are normal. There is no tenderness.   Musculoskeletal: Normal range of motion. He exhibits no edema or deformity.   Neurological: He is alert and oriented to person, place, and time. No cranial nerve deficit.   Skin: Skin is warm and dry. No rash noted. He is not diaphoretic. No erythema. No pallor.   Psychiatric: He has a normal mood and affect. His behavior is normal. Thought content normal.   Nursing note and vitals reviewed.      Procedures         ED Course  ED Course   Value Comment By Time   ECG 12 Lead Sinus tachycardia; otherwise no acute changes per BASSEM Maxwell 08/08 2219   XR Chest 1 View No acute cardio/pulm findings per Dr. Ellison. BASSEM Donovan 08/08 2318   NM Lung Ventilation Perfusion No evidence of PE per [facility] report. BASSEM Donovan 08/09 0118    Patient diagnosed with acute bronchitis. Will be d/c home with antibiotic, zofran and inhaler prescriptions. Will not take percocet with trazodone and lyrica at night. Will f/u with Dr. Carreon in 1 week or return to ER if symptoms worsen. Previously discussed elevated amylase with Dr. Nunes and with the patient. Since he is not symptomatic, we will have his PCP follow up with labs on this issue. Patient is aware of the need for this. BASSEM Donovan 08/09 0126                  MDM  Number of Diagnoses or Management Options     Amount and/or  Complexity of Data Reviewed  Clinical lab tests: reviewed and ordered  Tests in the radiology section of CPT®: reviewed and ordered  Tests in the medicine section of CPT®: reviewed        Final diagnoses:   Medication reaction, initial encounter   Acute bronchitis, unspecified organism            BASSEM Donovan  08/09/17 0131       BASSEM Donovan  08/09/17 0254       BASSEM Donovan  08/17/17 0813

## 2017-08-09 NOTE — DISCHARGE INSTRUCTIONS
Please start and finish augmentin and use zofran as needed for nausea. Please use inhaler as needed for shortness of breath. Please follow up with your PCP in 1 week or return to ER if symptoms worsen.    Hypertension  Hypertension, commonly called high blood pressure, is when the force of blood pumping through your arteries is too strong. Your arteries are the blood vessels that carry blood from your heart throughout your body. A blood pressure reading consists of a higher number over a lower number, such as 110/72. The higher number (systolic) is the pressure inside your arteries when your heart pumps. The lower number (diastolic) is the pressure inside your arteries when your heart relaxes. Ideally you want your blood pressure below 120/80.  Hypertension forces your heart to work harder to pump blood. Your arteries may become narrow or stiff. Having untreated or uncontrolled hypertension can cause heart attack, stroke, kidney disease, and other problems.  RISK FACTORS  Some risk factors for high blood pressure are controllable. Others are not.   Risk factors you cannot control include:   · Race. You may be at higher risk if you are .  · Age. Risk increases with age.  · Gender. Men are at higher risk than women before age 45 years. After age 65, women are at higher risk than men.  Risk factors you can control include:  · Not getting enough exercise or physical activity.  · Being overweight.  · Getting too much fat, sugar, calories, or salt in your diet.  · Drinking too much alcohol.  SIGNS AND SYMPTOMS  Hypertension does not usually cause signs or symptoms. Extremely high blood pressure (hypertensive crisis) may cause headache, anxiety, shortness of breath, and nosebleed.  DIAGNOSIS  To check if you have hypertension, your health care provider will measure your blood pressure while you are seated, with your arm held at the level of your heart. It should be measured at least twice using the same  arm. Certain conditions can cause a difference in blood pressure between your right and left arms. A blood pressure reading that is higher than normal on one occasion does not mean that you need treatment. If it is not clear whether you have high blood pressure, you may be asked to return on a different day to have your blood pressure checked again. Or, you may be asked to monitor your blood pressure at home for 1 or more weeks.  TREATMENT  Treating high blood pressure includes making lifestyle changes and possibly taking medicine. Living a healthy lifestyle can help lower high blood pressure. You may need to change some of your habits.  Lifestyle changes may include:  · Following the DASH diet. This diet is high in fruits, vegetables, and whole grains. It is low in salt, red meat, and added sugars.  · Keep your sodium intake below 2,300 mg per day.  · Getting at least 30-45 minutes of aerobic exercise at least 4 times per week.  · Losing weight if necessary.  · Not smoking.  · Limiting alcoholic beverages.  · Learning ways to reduce stress.  Your health care provider may prescribe medicine if lifestyle changes are not enough to get your blood pressure under control, and if one of the following is true:  · You are 18-59 years of age and your systolic blood pressure is above 140.  · You are 60 years of age or older, and your systolic blood pressure is above 150.  · Your diastolic blood pressure is above 90.  · You have diabetes, and your systolic blood pressure is over 140 or your diastolic blood pressure is over 90.  · You have kidney disease and your blood pressure is above 140/90.  · You have heart disease and your blood pressure is above 140/90.  Your personal target blood pressure may vary depending on your medical conditions, your age, and other factors.  HOME CARE INSTRUCTIONS  · Have your blood pressure rechecked as directed by your health care provider.    · Take medicines only as directed by your health  care provider. Follow the directions carefully. Blood pressure medicines must be taken as prescribed. The medicine does not work as well when you skip doses. Skipping doses also puts you at risk for problems.  · Do not smoke.    · Monitor your blood pressure at home as directed by your health care provider.   SEEK MEDICAL CARE IF:   · You think you are having a reaction to medicines taken.  · You have recurrent headaches or feel dizzy.  · You have swelling in your ankles.  · You have trouble with your vision.  SEEK IMMEDIATE MEDICAL CARE IF:  · You develop a severe headache or confusion.  · You have unusual weakness, numbness, or feel faint.  · You have severe chest or abdominal pain.  · You vomit repeatedly.  · You have trouble breathing.  MAKE SURE YOU:   · Understand these instructions.  · Will watch your condition.  · Will get help right away if you are not doing well or get worse.     This information is not intended to replace advice given to you by your health care provider. Make sure you discuss any questions you have with your health care provider.     Document Released: 12/18/2006 Document Revised: 05/03/2016 Document Reviewed: 10/10/2014  [employer] Patient Education ©2017 [employer]

## 2017-08-21 ENCOUNTER — TELEPHONE (OUTPATIENT)
Dept: PAIN MEDICINE | Facility: CLINIC | Age: 67
End: 2017-08-21

## 2017-09-28 ENCOUNTER — OFFICE VISIT (OUTPATIENT)
Dept: CARDIOLOGY | Facility: CLINIC | Age: 67
End: 2017-09-28

## 2017-09-28 VITALS
HEIGHT: 72 IN | RESPIRATION RATE: 16 BRPM | WEIGHT: 244 LBS | HEART RATE: 89 BPM | SYSTOLIC BLOOD PRESSURE: 130 MMHG | DIASTOLIC BLOOD PRESSURE: 97 MMHG | BODY MASS INDEX: 33.05 KG/M2

## 2017-09-28 DIAGNOSIS — E78.5 DYSLIPIDEMIA: ICD-10-CM

## 2017-09-28 DIAGNOSIS — E66.8 MODERATE OBESITY: Primary | ICD-10-CM

## 2017-09-28 DIAGNOSIS — I10 ESSENTIAL HYPERTENSION: ICD-10-CM

## 2017-09-28 PROCEDURE — 93000 ELECTROCARDIOGRAM COMPLETE: CPT | Performed by: INTERNAL MEDICINE

## 2017-09-28 PROCEDURE — 99203 OFFICE O/P NEW LOW 30 MIN: CPT | Performed by: INTERNAL MEDICINE

## 2017-09-28 RX ORDER — OXYCODONE AND ACETAMINOPHEN 7.5; 325 MG/1; MG/1
1 TABLET ORAL 2 TIMES DAILY
COMMUNITY

## 2017-09-28 NOTE — PROGRESS NOTES
Juanis Carreon MD  Williams Lora  : 1950  DATE:2017    Patient Active Problem List   Diagnosis   • Postlaminectomy syndrome of lumbar region   • Lumbar facet arthropathy   • Hx of migraine headaches   • Moderate obesity   • Insomnia   • Essential hypertension   • Physical deconditioning   • Dyslipidemia       Dear Juanis Carreon MD:    Subjective     Williams Lora is a 67 y.o. male with the above medical problems who is being seen for consultation today at the request of Juanis Carreon MD. According to the patient he had a recent episode of neck and shoulder pain radiating to his left arm for which he was evaluated.  There was initial concern that this is related to coronary artery disease but he was later diagnosed with shingles.  He also states that a couple of weeks ago he had a recent or shortness of breath that was diagnosed as bronchitis.  He received an antibiotic course and showed significant improvement.  According to the patient he is currently asymptomatic.  Denies any chest pain, shortness breath, palpitations, orthopnea, PND, lower extremity edema, dizziness or syncope.  He does continue to complain of difficulty moving the left arm which appears to be musculoskeletal in nature.  He also states he has started dieting and exercising.  He has a history of smoking in the past but quit in .      Past Medical History:   Diagnosis Date   • Arthritis    • Chronic pain disorder    • Depression     RECENT DEATH OF DAUGHTER   • Extremity pain    • Grief     Sudden loss of daughter in law 4 weeks ago   • Headache    • History of gastritis    • History of gout    • History of kidney stones    • Hypertension    • Low back pain    • Lumbosacral disc disease    • Migraines    • Rheumatic fever    • Wears glasses        Past Surgical History:   Procedure Laterality Date   • CHOLECYSTECTOMY     • COLONOSCOPY     • CYSTOSCOPY W/ LITHOLAPAXY / [facility]     • LUMBAR FUSION  10/2013     posterior lumbar interbody fusion, L4-L5, Dr. Raad Rivera   • SPINAL CORD STIMULATOR IMPLANT  07/18/2016   • SPINAL CORD STIMULATOR IMPLANT N/A 9/26/2016    Procedure: SPINAL CORD STIMULATOR INSERTION PHASE 1;  Surgeon: Dany Ratliff MD;  Location: [facility];  Service:        Family History   Problem Relation Age of Onset   • Heart disease Mother    • Pulmonary fibrosis Father        Social History     Social History   • Marital status:      Spouse name: N/A   • Number of children: N/A   • Years of education: N/A     Occupational History   • Not on file.     Social History Main Topics   • Smoking status: Former Smoker     Years: 20.00     Types: Cigarettes     Quit date: 8/19/1996   • Smokeless tobacco: Never Used   • Alcohol use No      Comment: Rarely   • Drug use: No   • Sexual activity: Defer     Other Topics Concern   • Not on file     Social History Narrative         Current Outpatient Prescriptions:   •  amLODIPine (NORVASC) 10 MG tablet, TAKE 1 TABLET BY MOUTH EVERY DAY, Disp: , Rfl: 3  •  oxyCODONE-acetaminophen (PERCOCET) 7.5-325 MG per tablet, Take 1 tablet by mouth 2 (Two) Times a Day., Disp: , Rfl:   •  traZODone (DESYREL) 50 MG tablet, TAKE 1 TO 3 TABLETS AT BEDTIME., Disp: , Rfl: 2    The following portions of the patient's history were reviewed and updated as appropriate: allergies, current medications, past family history, past medical history, past social history, past surgical history and problem list.    Review of Systems   Constitution: Negative for diaphoresis, fever, weakness, malaise/fatigue, weight gain and weight loss.   HENT: Negative for congestion, ear discharge, ear pain, hearing loss, hoarse voice, nosebleeds, sore throat and tinnitus.    Eyes: Negative for blurred vision, discharge, double vision and pain.   Cardiovascular: Negative for chest pain, dyspnea on exertion, irregular heartbeat, leg swelling and palpitations.   Respiratory: Negative for cough, hemoptysis,  "shortness of breath and wheezing.    Endocrine: Negative for cold intolerance, heat intolerance, polydipsia, polyphagia and polyuria.   Hematologic/Lymphatic: Negative for bleeding problem. Does not bruise/bleed easily.   Skin: Negative for color change, dry skin, flushing, itching and rash.   Musculoskeletal: Positive for back pain, muscle cramps and muscle weakness. Negative for arthritis, joint pain, joint swelling, neck pain and stiffness.   Gastrointestinal: Negative for abdominal pain, change in bowel habit, constipation, diarrhea, heartburn, hematemesis, hematochezia, melena, nausea and vomiting.   Genitourinary: Negative for bladder incontinence, decreased libido, dysuria, frequency and hematuria.   Neurological: Negative for disturbances in coordination, dizziness, focal weakness, headaches, light-headedness, loss of balance, numbness, paresthesias and tremors.   Psychiatric/Behavioral: Negative for altered mental status, depression and memory loss. The patient does not have insomnia.    Allergic/Immunologic: Negative for hives.       Objective   Blood pressure 130/97, pulse 89, resp. rate 16, height 72\" (182.9 cm), weight 244 lb (111 kg).    Physical Exam   Constitutional: He is oriented to person, place, and time. He appears well-developed and well-nourished.   Obese white male sitting comfortably on chair.   HENT:   Mouth/Throat: Oropharynx is clear and moist.   Eyes: EOM are normal. Pupils are equal, round, and reactive to light.   Neck: Neck supple. No JVD present. No tracheal deviation present. No thyromegaly present.   Cardiovascular: Normal rate, regular rhythm, S1 normal and S2 normal.  Exam reveals no gallop and no friction rub.    No murmur heard.  Pulmonary/Chest: Effort normal and breath sounds normal. No respiratory distress. He has no wheezes. He has no rales.   Abdominal: Soft. Bowel sounds are normal. He exhibits no mass. There is no tenderness.   Musculoskeletal: He exhibits no edema. "   Decreased range of motion in the left arm.   Lymphadenopathy:     He has no cervical adenopathy.   Neurological: He is alert and oriented to person, place, and time.   Skin: Skin is warm and dry. No rash noted.   Psychiatric: He has a normal mood and affect.         ECG 12 Lead  Date/Time: 9/28/2017 8:58 AM  Performed by: DUSTY OTOOLE  Authorized by: DUSTY OTOOLE   Comparison: not compared with previous ECG   Previous ECG: no previous ECG available  Rhythm: sinus rhythm  Rate: normal  BPM: 70  Conduction: conduction normal  ST Segments: ST segments normal  T Waves: T waves normal  QRS axis: normal  Other: no other findings  Clinical impression: normal ECG            Assessment/Plan      1.  Moderate obesity: Patient with moderate obesity who is currently undergoing a diet and losing weight.  I encouraged him to continue and to continue to exercise.    2.  Hypertension: Patient is a history of hypertension which appears to be well-controlled on current regimen.  At this point will continue.  We'll check a CMP.    3.  Dyslipidemia: Patient with history of dyslipidemia with no recent lipid panel record.  Will check.       Diagnosis Plan   1. Moderate obesity     2. Essential hypertension  Comprehensive Metabolic Panel   3. Dyslipidemia  Lipid Panel            Return in about 2 months (around 11/28/2017).    I appreciate the opportunity to participate in this patient's cardiovascular care.    Best Regards    Dusty Walter

## 2017-10-03 LAB
ALBUMIN SERPL-MCNC: 4.7 G/DL (ref 3.6–4.8)
ALBUMIN/GLOB SERPL: 1.9 {RATIO} (ref 1.2–2.2)
ALP SERPL-CCNC: 61 IU/L (ref 39–117)
ALT SERPL-CCNC: 25 IU/L (ref 0–44)
AMBIG ABBREV CMP14 DEFAULT: NORMAL
AMBIG ABBREV LP DEFAULT: NORMAL
AST SERPL-CCNC: 29 IU/L (ref 0–40)
BILIRUB SERPL-MCNC: 0.4 MG/DL (ref 0–1.2)
BUN SERPL-MCNC: 11 MG/DL (ref 8–27)
BUN/CREAT SERPL: 10 (ref 10–24)
CALCIUM SERPL-MCNC: 9.7 MG/DL (ref 8.6–10.2)
CHLORIDE SERPL-SCNC: 102 MMOL/L (ref 96–106)
CHOLEST SERPL-MCNC: 158 MG/DL (ref 100–199)
CO2 SERPL-SCNC: 28 MMOL/L (ref 18–29)
CREAT SERPL-MCNC: 1.15 MG/DL (ref 0.76–1.27)
GLOBULIN SER CALC-MCNC: 2.5 G/DL (ref 1.5–4.5)
GLUCOSE SERPL-MCNC: 95 MG/DL (ref 65–99)
HDLC SERPL-MCNC: 34 MG/DL
LDLC SERPL CALC-MCNC: 92 MG/DL (ref 0–99)
POTASSIUM SERPL-SCNC: 4.1 MMOL/L (ref 3.5–5.2)
PROT SERPL-MCNC: 7.2 G/DL (ref 6–8.5)
SODIUM SERPL-SCNC: 139 MMOL/L (ref 134–144)
TRIGL SERPL-MCNC: 161 MG/DL (ref 0–149)
VLDLC SERPL CALC-MCNC: 32 MG/DL (ref 5–40)

## 2017-11-16 ENCOUNTER — OFFICE VISIT (OUTPATIENT)
Dept: CARDIOLOGY | Facility: CLINIC | Age: 67
End: 2017-11-16

## 2017-11-16 VITALS
BODY MASS INDEX: 32.26 KG/M2 | WEIGHT: 238.2 LBS | DIASTOLIC BLOOD PRESSURE: 82 MMHG | HEIGHT: 72 IN | RESPIRATION RATE: 16 BRPM | HEART RATE: 73 BPM | SYSTOLIC BLOOD PRESSURE: 126 MMHG

## 2017-11-16 DIAGNOSIS — E66.8 MODERATE OBESITY: Primary | ICD-10-CM

## 2017-11-16 DIAGNOSIS — E78.5 DYSLIPIDEMIA: ICD-10-CM

## 2017-11-16 DIAGNOSIS — I10 ESSENTIAL HYPERTENSION: ICD-10-CM

## 2017-11-16 PROCEDURE — 99212 OFFICE O/P EST SF 10 MIN: CPT | Performed by: INTERNAL MEDICINE

## 2017-11-16 NOTE — PROGRESS NOTES
"Juanis Carreon MD  Williams Lora  : 1950  DATE:2017    Patient Active Problem List   Diagnosis   • Postlaminectomy syndrome of lumbar region   • Lumbar facet arthropathy   • Hx of migraine headaches   • Moderate obesity   • Insomnia   • Essential hypertension   • Physical deconditioning   • Dyslipidemia       Dear Juanis Carreon MD:    Subjective     Williams Lora is a 67 y.o. male with the above medical problems who is being seen for follow up. According to the patient he has been Doing well since his last visit.  He denies any chest pain, shortness of breath, palpitations, orthopnea, PND, lower extremity edema, dizziness or syncope.  She underwent a lipid panel that showed mildly elevated triglycerides.        Current Outpatient Prescriptions:   •  amLODIPine (NORVASC) 10 MG tablet, TAKE 1 TABLET BY MOUTH EVERY DAY, Disp: , Rfl: 3  •  oxyCODONE-acetaminophen (PERCOCET) 7.5-325 MG per tablet, Take 1 tablet by mouth 2 (Two) Times a Day., Disp: , Rfl:   •  traZODone (DESYREL) 50 MG tablet, TAKE 1 TO 3 TABLETS AT BEDTIME., Disp: , Rfl: 2    The following portions of the patient's history were reviewed and updated as appropriate: allergies, current medications, past family history, past medical history, past social history, past surgical history and problem list.    Review of Systems   Constitution: Negative for chills and fever.   HENT: Negative for nosebleeds and sore throat.    Cardiovascular: Negative for chest pain, leg swelling, palpitations and syncope.   Respiratory: Negative for cough, hemoptysis, shortness of breath and wheezing.    Gastrointestinal: Negative for abdominal pain, hematemesis, hematochezia, melena, nausea and vomiting.   Genitourinary: Negative for dysuria and hematuria.   Neurological: Negative for dizziness and headaches.       Objective   Blood pressure 126/82, pulse 73, resp. rate 16, height 72\" (182.9 cm), weight 238 lb 3.2 oz (108 kg).    Physical Exam "   Constitutional: He is oriented to person, place, and time. He appears well-developed and well-nourished.   Obese white male sitting comfortably on chair.   HENT:   Mouth/Throat: Oropharynx is clear and moist.   Eyes: EOM are normal. Pupils are equal, round, and reactive to light.   Neck: Neck supple. No JVD present. No tracheal deviation present. No thyromegaly present.   Cardiovascular: Normal rate, regular rhythm, S1 normal and S2 normal.  Exam reveals no gallop and no friction rub.    No murmur heard.  Pulmonary/Chest: Effort normal and breath sounds normal. No respiratory distress. He has no wheezes. He has no rales.   Abdominal: Soft. Bowel sounds are normal. He exhibits no mass. There is no tenderness.   Musculoskeletal: He exhibits no edema.   Decreased range of motion in the left arm.   Lymphadenopathy:     He has no cervical adenopathy.   Neurological: He is alert and oriented to person, place, and time.   Skin: Skin is warm and dry. No rash noted.   Psychiatric: He has a normal mood and affect.       Procedures    Assessment/Plan      1.  Moderate obesity: Patient with moderate obesity who is currently undergoing a diet and losing weight.  I encouraged him to continue and to continue to exercise.    2.  Hypertension: Patient is a history of hypertension which appears to be well-controlled on current regimen.  At this point will continue.     3.  Dyslipidemia: Patient with history of dyslipidemia with lipid panel showing mildly elevated triglycerides.  I requested the patient repeat a lipid panel in about 3 months.  I have encouraged him to decrease his carbohydrate intake.       Diagnosis Plan   1. Moderate obesity     2. Essential hypertension     3. Dyslipidemia  Lipid Panel            Return in about 6 months (around 5/16/2018).    I appreciate the opportunity to participate in this patient's cardiovascular care.    Best Regards    John Walter

## 2018-12-04 ENCOUNTER — TRANSCRIBE ORDERS (OUTPATIENT)
Dept: LAB | Facility: HOSPITAL | Age: 68
End: 2018-12-04

## 2018-12-04 ENCOUNTER — HOSPITAL ENCOUNTER (OUTPATIENT)
Dept: ULTRASOUND IMAGING | Facility: HOSPITAL | Age: 68
Discharge: HOME | End: 2018-12-04
Admitting: FAMILY MEDICINE

## 2018-12-04 DIAGNOSIS — Z13.6 SCREENING FOR AAA (AORTIC ABDOMINAL ANEURYSM): Primary | ICD-10-CM

## 2018-12-04 DIAGNOSIS — Z13.6 SCREENING FOR AAA (AORTIC ABDOMINAL ANEURYSM): ICD-10-CM

## 2018-12-04 PROCEDURE — 76706 US ABDL AORTA SCREEN AAA: CPT | Performed by: RADIOLOGY

## 2018-12-04 PROCEDURE — 76706 US ABDL AORTA SCREEN AAA: CPT

## 2019-02-25 ENCOUNTER — HOSPITAL ENCOUNTER (EMERGENCY)
Facility: HOSPITAL | Age: 69
Discharge: HOME | End: 2019-02-25
Attending: EMERGENCY MEDICINE | Admitting: NURSE PRACTITIONER

## 2019-02-25 ENCOUNTER — APPOINTMENT (OUTPATIENT)
Dept: ULTRASOUND IMAGING | Facility: HOSPITAL | Age: 69
End: 2019-02-25

## 2019-02-25 ENCOUNTER — APPOINTMENT (OUTPATIENT)
Dept: GENERAL RADIOLOGY | Facility: HOSPITAL | Age: 69
End: 2019-02-25

## 2019-02-25 VITALS
HEIGHT: 72 IN | DIASTOLIC BLOOD PRESSURE: 98 MMHG | TEMPERATURE: 98.2 F | SYSTOLIC BLOOD PRESSURE: 132 MMHG | WEIGHT: 250 LBS | HEART RATE: 94 BPM | RESPIRATION RATE: 20 BRPM | BODY MASS INDEX: 33.86 KG/M2 | OXYGEN SATURATION: 97 %

## 2019-02-25 DIAGNOSIS — M25.562 ACUTE PAIN OF LEFT KNEE: Primary | ICD-10-CM

## 2019-02-25 DIAGNOSIS — M17.12 OSTEOARTHRITIS OF LEFT KNEE, UNSPECIFIED OSTEOARTHRITIS TYPE: ICD-10-CM

## 2019-02-25 LAB
ALBUMIN SERPL-MCNC: 4.9 G/DL (ref 3.4–4.8)
ALBUMIN/GLOB SERPL: 1.6 G/DL (ref 1.5–2.5)
ALP SERPL-CCNC: 61 U/L (ref 40–129)
ALT SERPL W P-5'-P-CCNC: 83 U/L (ref 10–44)
ANION GAP SERPL CALCULATED.3IONS-SCNC: 6.8 MMOL/L (ref 3.6–11.2)
AST SERPL-CCNC: 46 U/L (ref 10–34)
BASOPHILS # BLD AUTO: 0.03 10*3/MM3 (ref 0–0.3)
BASOPHILS NFR BLD AUTO: 0.2 % (ref 0–2)
BILIRUB SERPL-MCNC: 0.8 MG/DL (ref 0.2–1.8)
BUN BLD-MCNC: 11 MG/DL (ref 7–21)
BUN/CREAT SERPL: 10.4 (ref 7–25)
CALCIUM SPEC-SCNC: 9.6 MG/DL (ref 7.7–10)
CHLORIDE SERPL-SCNC: 105 MMOL/L (ref 99–112)
CO2 SERPL-SCNC: 25.2 MMOL/L (ref 24.3–31.9)
CREAT BLD-MCNC: 1.06 MG/DL (ref 0.43–1.29)
D DIMER PPP FEU-MCNC: 0.53 MCGFEU/ML (ref 0–0.5)
DEPRECATED RDW RBC AUTO: 41.5 FL (ref 37–54)
EOSINOPHIL # BLD AUTO: 0.08 10*3/MM3 (ref 0–0.7)
EOSINOPHIL NFR BLD AUTO: 0.6 % (ref 0–7)
ERYTHROCYTE [DISTWIDTH] IN BLOOD BY AUTOMATED COUNT: 13.1 % (ref 11.5–14.5)
GFR SERPL CREATININE-BSD FRML MDRD: 69 ML/MIN/1.73
GLOBULIN UR ELPH-MCNC: 3 GM/DL
GLUCOSE BLD-MCNC: 171 MG/DL (ref 70–110)
HCT VFR BLD AUTO: 44.6 % (ref 42–52)
HGB BLD-MCNC: 15.4 G/DL (ref 14–18)
IMM GRANULOCYTES # BLD AUTO: 0.03 10*3/MM3 (ref 0–0.03)
IMM GRANULOCYTES NFR BLD AUTO: 0.2 % (ref 0–0.5)
LYMPHOCYTES # BLD AUTO: 1.4 10*3/MM3 (ref 1–3)
LYMPHOCYTES NFR BLD AUTO: 10 % (ref 16–46)
MCH RBC QN AUTO: 30 PG (ref 27–33)
MCHC RBC AUTO-ENTMCNC: 34.5 G/DL (ref 33–37)
MCV RBC AUTO: 86.8 FL (ref 80–94)
MONOCYTES # BLD AUTO: 1.3 10*3/MM3 (ref 0.1–0.9)
MONOCYTES NFR BLD AUTO: 9.3 % (ref 0–12)
NEUTROPHILS # BLD AUTO: 11.18 10*3/MM3 (ref 1.4–6.5)
NEUTROPHILS NFR BLD AUTO: 79.7 % (ref 40–75)
OSMOLALITY SERPL CALC.SUM OF ELEC: 277.2 MOSM/KG (ref 273–305)
PLATELET # BLD AUTO: 213 10*3/MM3 (ref 130–400)
PMV BLD AUTO: 10.2 FL (ref 6–10)
POTASSIUM BLD-SCNC: 4.4 MMOL/L (ref 3.5–5.3)
PROT SERPL-MCNC: 7.9 G/DL (ref 6–8)
RBC # BLD AUTO: 5.14 10*6/MM3 (ref 4.7–6.1)
SODIUM BLD-SCNC: 137 MMOL/L (ref 135–153)
URATE SERPL-MCNC: 8.4 MG/DL (ref 3.7–7)
WBC NRBC COR # BLD: 14.02 10*3/MM3 (ref 4.5–12.5)

## 2019-02-25 PROCEDURE — 36415 COLL VENOUS BLD VENIPUNCTURE: CPT

## 2019-02-25 PROCEDURE — 84550 ASSAY OF BLOOD/URIC ACID: CPT | Performed by: NURSE PRACTITIONER

## 2019-02-25 PROCEDURE — 99284 EMERGENCY DEPT VISIT MOD MDM: CPT

## 2019-02-25 PROCEDURE — 93971 EXTREMITY STUDY: CPT

## 2019-02-25 PROCEDURE — 85379 FIBRIN DEGRADATION QUANT: CPT | Performed by: NURSE PRACTITIONER

## 2019-02-25 PROCEDURE — 25010000002 PROMETHAZINE PER 50 MG: Performed by: NURSE PRACTITIONER

## 2019-02-25 PROCEDURE — 96372 THER/PROPH/DIAG INJ SC/IM: CPT

## 2019-02-25 PROCEDURE — 93971 EXTREMITY STUDY: CPT | Performed by: RADIOLOGY

## 2019-02-25 PROCEDURE — 73564 X-RAY EXAM KNEE 4 OR MORE: CPT | Performed by: RADIOLOGY

## 2019-02-25 PROCEDURE — 85025 COMPLETE CBC W/AUTO DIFF WBC: CPT | Performed by: NURSE PRACTITIONER

## 2019-02-25 PROCEDURE — 73564 X-RAY EXAM KNEE 4 OR MORE: CPT

## 2019-02-25 PROCEDURE — 25010000002 HYDROMORPHONE 1 MG/ML SOLUTION: Performed by: NURSE PRACTITIONER

## 2019-02-25 PROCEDURE — 80053 COMPREHEN METABOLIC PANEL: CPT | Performed by: NURSE PRACTITIONER

## 2019-02-25 RX ORDER — PROMETHAZINE HYDROCHLORIDE 25 MG/ML
12.5 INJECTION, SOLUTION INTRAMUSCULAR; INTRAVENOUS ONCE
Status: COMPLETED | OUTPATIENT
Start: 2019-02-25 | End: 2019-02-25

## 2019-02-25 RX ORDER — INDOMETHACIN 50 MG/1
50 CAPSULE ORAL 3 TIMES DAILY PRN
Qty: 30 CAPSULE | Refills: 0 | Status: SHIPPED | OUTPATIENT
Start: 2019-02-25

## 2019-02-25 RX ADMIN — HYDROMORPHONE HYDROCHLORIDE 1 MG: 1 INJECTION, SOLUTION INTRAMUSCULAR; INTRAVENOUS; SUBCUTANEOUS at 10:03

## 2019-02-25 RX ADMIN — PROMETHAZINE HYDROCHLORIDE 12.5 MG: 25 INJECTION INTRAMUSCULAR; INTRAVENOUS at 10:03

## 2021-02-05 PROCEDURE — 93010 ELECTROCARDIOGRAM REPORT: CPT | Performed by: INTERNAL MEDICINE

## 2021-02-09 ENCOUNTER — TRANSCRIBE ORDERS (OUTPATIENT)
Dept: LAB | Facility: HOSPITAL | Age: 71
End: 2021-02-09

## 2021-02-09 ENCOUNTER — HOSPITAL ENCOUNTER (OUTPATIENT)
Dept: CARDIOLOGY | Facility: HOSPITAL | Age: 71
Discharge: HOME | End: 2021-02-09
Admitting: SURGERY

## 2021-02-09 DIAGNOSIS — I10 ESSENTIAL HYPERTENSION: ICD-10-CM

## 2021-02-09 DIAGNOSIS — I10 ESSENTIAL HYPERTENSION: Primary | ICD-10-CM

## 2021-02-09 LAB
QT INTERVAL: 386 MS
QTC INTERVAL: 434 MS

## 2021-02-09 PROCEDURE — 93005 ELECTROCARDIOGRAM TRACING: CPT | Performed by: SURGERY

## 2021-02-11 ENCOUNTER — TRANSCRIBE ORDERS (OUTPATIENT)
Dept: ADMINISTRATIVE | Facility: HOSPITAL | Age: 71
End: 2021-02-11

## 2021-02-11 DIAGNOSIS — Z01.818 PREOP EXAMINATION: Primary | ICD-10-CM

## 2021-02-17 DIAGNOSIS — Z23 IMMUNIZATION DUE: ICD-10-CM

## 2021-02-18 ENCOUNTER — IMMUNIZATION (OUTPATIENT)
Dept: VACCINE CLINIC | Facility: HOSPITAL | Age: 71
End: 2021-02-18

## 2021-02-18 PROCEDURE — 0001A: CPT | Performed by: INTERNAL MEDICINE

## 2021-02-18 PROCEDURE — 91300 HC SARSCOV02 VAC 30MCG/0.3ML IM: CPT | Performed by: INTERNAL MEDICINE

## 2021-02-19 ENCOUNTER — TRANSCRIBE ORDERS (OUTPATIENT)
Dept: ADMINISTRATIVE | Facility: HOSPITAL | Age: 71
End: 2021-02-19

## 2021-02-19 DIAGNOSIS — Z01.818 PRE-OPERATIVE CLEARANCE: Primary | ICD-10-CM

## 2021-02-22 ENCOUNTER — LAB (OUTPATIENT)
Dept: LAB | Facility: HOSPITAL | Age: 71
End: 2021-02-22

## 2021-02-22 DIAGNOSIS — Z01.818 PRE-OPERATIVE CLEARANCE: ICD-10-CM

## 2021-02-22 LAB — SARS-COV-2 RNA RESP QL NAA+PROBE: NOT DETECTED

## 2021-02-22 PROCEDURE — U0005 INFEC AGEN DETEC AMPLI PROBE: HCPCS

## 2021-02-22 PROCEDURE — U0004 COV-19 TEST NON-CDC HGH THRU: HCPCS

## 2021-02-22 PROCEDURE — C9803 HOPD COVID-19 SPEC COLLECT: HCPCS

## 2021-03-11 ENCOUNTER — IMMUNIZATION (OUTPATIENT)
Dept: VACCINE CLINIC | Facility: HOSPITAL | Age: 71
End: 2021-03-11

## 2021-03-11 PROCEDURE — 91300 HC SARSCOV02 VAC 30MCG/0.3ML IM: CPT | Performed by: INTERNAL MEDICINE

## 2021-03-11 PROCEDURE — 0002A: CPT | Performed by: INTERNAL MEDICINE

## 2021-05-04 ENCOUNTER — TRANSCRIBE ORDERS (OUTPATIENT)
Dept: LAB | Facility: HOSPITAL | Age: 71
End: 2021-05-04

## 2021-05-04 ENCOUNTER — HOSPITAL ENCOUNTER (OUTPATIENT)
Dept: GENERAL RADIOLOGY | Facility: HOSPITAL | Age: 71
Discharge: HOME | End: 2021-05-04

## 2021-05-04 DIAGNOSIS — M25.519 SHOULDER PAIN, UNSPECIFIED CHRONICITY, UNSPECIFIED LATERALITY: Primary | ICD-10-CM

## 2021-05-04 DIAGNOSIS — M25.519 SHOULDER PAIN, UNSPECIFIED CHRONICITY, UNSPECIFIED LATERALITY: ICD-10-CM

## 2021-05-04 PROCEDURE — 71046 X-RAY EXAM CHEST 2 VIEWS: CPT | Performed by: RADIOLOGY

## 2021-05-04 PROCEDURE — 72072 X-RAY EXAM THORAC SPINE 3VWS: CPT | Performed by: RADIOLOGY

## 2021-05-04 PROCEDURE — 71046 X-RAY EXAM CHEST 2 VIEWS: CPT

## 2021-05-04 PROCEDURE — 72072 X-RAY EXAM THORAC SPINE 3VWS: CPT

## 2022-05-17 ENCOUNTER — TRANSCRIBE ORDERS (OUTPATIENT)
Dept: LAB | Facility: HOSPITAL | Age: 72
End: 2022-05-17

## 2022-05-17 ENCOUNTER — HOSPITAL ENCOUNTER (OUTPATIENT)
Dept: CARDIOLOGY | Facility: HOSPITAL | Age: 72
Discharge: HOME | End: 2022-05-17
Admitting: FAMILY MEDICINE

## 2022-05-17 DIAGNOSIS — E11.9 TYPE 2 DIABETES MELLITUS WITHOUT COMPLICATION, WITH LONG-TERM CURRENT USE OF INSULIN: Primary | ICD-10-CM

## 2022-05-17 DIAGNOSIS — Z79.4 TYPE 2 DIABETES MELLITUS WITHOUT COMPLICATION, WITH LONG-TERM CURRENT USE OF INSULIN: Primary | ICD-10-CM

## 2022-05-17 DIAGNOSIS — E11.9 TYPE 2 DIABETES MELLITUS WITHOUT COMPLICATION, WITH LONG-TERM CURRENT USE OF INSULIN: ICD-10-CM

## 2022-05-17 DIAGNOSIS — Z79.4 TYPE 2 DIABETES MELLITUS WITHOUT COMPLICATION, WITH LONG-TERM CURRENT USE OF INSULIN: ICD-10-CM

## 2022-05-17 LAB
QT INTERVAL: 384 MS
QTC INTERVAL: 428 MS

## 2022-05-17 PROCEDURE — 93010 ELECTROCARDIOGRAM REPORT: CPT | Performed by: INTERNAL MEDICINE

## 2022-05-17 PROCEDURE — 93005 ELECTROCARDIOGRAM TRACING: CPT | Performed by: FAMILY MEDICINE

## 2023-04-28 ENCOUNTER — TRANSCRIBE ORDERS (OUTPATIENT)
Dept: LAB | Facility: HOSPITAL | Age: 73
End: 2023-04-28
Payer: MEDICARE

## 2023-04-28 ENCOUNTER — HOSPITAL ENCOUNTER (OUTPATIENT)
Dept: CARDIOLOGY | Facility: HOSPITAL | Age: 73
Discharge: HOME | End: 2023-04-28
Payer: MEDICARE

## 2023-04-28 DIAGNOSIS — R94.31 NONSPECIFIC ABNORMAL ELECTROCARDIOGRAM (ECG) (EKG): ICD-10-CM

## 2023-04-28 DIAGNOSIS — R94.31 NONSPECIFIC ABNORMAL ELECTROCARDIOGRAM (ECG) (EKG): Primary | ICD-10-CM

## 2023-04-28 LAB
QT INTERVAL: 408 MS
QTC INTERVAL: 467 MS

## 2023-04-28 PROCEDURE — 93005 ELECTROCARDIOGRAM TRACING: CPT | Performed by: FAMILY MEDICINE

## 2023-05-23 ENCOUNTER — TELEPHONE (OUTPATIENT)
Dept: CARDIOLOGY | Facility: CLINIC | Age: 73
End: 2023-05-23

## 2023-05-23 ENCOUNTER — OFFICE VISIT (OUTPATIENT)
Dept: CARDIOLOGY | Facility: CLINIC | Age: 73
End: 2023-05-23
Payer: MEDICARE

## 2023-05-23 VITALS
HEIGHT: 72 IN | OXYGEN SATURATION: 95 % | BODY MASS INDEX: 32.05 KG/M2 | SYSTOLIC BLOOD PRESSURE: 151 MMHG | DIASTOLIC BLOOD PRESSURE: 89 MMHG | HEART RATE: 56 BPM | RESPIRATION RATE: 18 BRPM | WEIGHT: 236.6 LBS

## 2023-05-23 DIAGNOSIS — I49.3 PVC'S (PREMATURE VENTRICULAR CONTRACTIONS): ICD-10-CM

## 2023-05-23 DIAGNOSIS — R07.2 PRECORDIAL CHEST PAIN: ICD-10-CM

## 2023-05-23 DIAGNOSIS — Z01.810 PRE-OPERATIVE CARDIOVASCULAR EXAMINATION: Primary | ICD-10-CM

## 2023-05-23 DIAGNOSIS — I10 ESSENTIAL HYPERTENSION: ICD-10-CM

## 2023-05-23 DIAGNOSIS — E78.5 DYSLIPIDEMIA: ICD-10-CM

## 2023-05-23 DIAGNOSIS — R73.03 PREDIABETES: ICD-10-CM

## 2023-05-23 DIAGNOSIS — F17.211 CIGARETTE NICOTINE DEPENDENCE IN REMISSION: ICD-10-CM

## 2023-05-23 PROCEDURE — 99204 OFFICE O/P NEW MOD 45 MIN: CPT | Performed by: SPECIALIST

## 2023-05-23 RX ORDER — PRAVASTATIN SODIUM 40 MG
40 TABLET ORAL DAILY
COMMUNITY

## 2023-05-23 NOTE — PROGRESS NOTES
Subjective   Initial consultation,preoperative cardiac risk, frequent PVCS  Williams Lora is a 72 y.o. male who presents to day for New patient (Abn EKG).    CHIEF COMPLIANT  Chief Complaint   Patient presents with   • New patient     Abn EKG       Active Problems:  Problem List Items Addressed This Visit        Cardiac and Vasculature    Essential hypertension    Dyslipidemia    Pre-operative cardiovascular examination - Primary    Precordial chest pain    PVC's (premature ventricular contractions)       Endocrine and Metabolic    Prediabetes       Tobacco    Cigarette nicotine dependence in remission       HPI  HPI  Patient is going to have a hernia repair and at the preoperative visit.  His surgery was canceled because of frequent premature ventricular complexes at the monitor and he was referred for assessment he is extremely inactive physically because of severe chronic low back pain and he also has gained recent almost 30 pounds of weight but he is also recently maybe in the last couple of months or so is having left-sided chest tingling which can last for few minutes on and off no recent shortness of breath or edema he is denying any palpitations or dizziness he is known to have history of hypertension but his blood pressure is well controlled he is also been told that he is prediabetic he also has hyperlipidemia he used to smoke he quit in  for almost 20 years about a pack and half per day history his mother who had a CABG, and brother had heart attacks his brother  of heart attack in his early 50s  PRIOR MEDS  Current Outpatient Medications on File Prior to Visit   Medication Sig Dispense Refill   • amLODIPine (NORVASC) 10 MG tablet TAKE 1 TABLET BY MOUTH EVERY DAY  3   • indomethacin (INDOCIN) 50 MG capsule Take 1 capsule by mouth 3 (Three) Times a Day As Needed for Mild Pain  or Moderate Pain . 30 capsule 0   • oxyCODONE-acetaminophen (PERCOCET) 7.5-325 MG per tablet Take 1 tablet by mouth 2  (Two) Times a Day.     • pravastatin (PRAVACHOL) 40 MG tablet Take 1 tablet by mouth Daily.     • traZODone (DESYREL) 50 MG tablet TAKE 1 TO 3 TABLETS AT BEDTIME.  2     No current facility-administered medications on file prior to visit.       ALLERGIES  Patient has no known allergies.    HISTORY  Past Medical History:   Diagnosis Date   • Arthritis    • Chronic pain disorder    • Depression     RECENT DEATH OF DAUGHTER   • Extremity pain    • Grief     Sudden loss of daughter in law 4 weeks ago   • Headache    • History of gastritis    • History of gout    • History of kidney stones    • Hypertension    • Low back pain    • Lumbosacral disc disease    • Migraines    • Rheumatic fever    • Wears glasses        Social History     Socioeconomic History   • Marital status:    Tobacco Use   • Smoking status: Former     Years: 20.00     Types: Cigarettes     Quit date: 1996     Years since quittin.7   • Smokeless tobacco: Never   Vaping Use   • Vaping Use: Never used   Substance and Sexual Activity   • Alcohol use: No     Comment: Rarely   • Drug use: No   • Sexual activity: Defer       Family History   Problem Relation Age of Onset   • Heart disease Mother    • Pulmonary fibrosis Father        Review of Systems   Constitutional: Negative.    HENT: Negative.    Eyes: Negative.    Respiratory: Positive for chest tightness. Negative for apnea, cough, choking, shortness of breath, wheezing and stridor.    Cardiovascular: Negative.  Negative for chest pain, palpitations and leg swelling.   Gastrointestinal: Negative.    Endocrine: Negative.    Genitourinary: Negative.    Musculoskeletal: Positive for back pain, joint swelling, neck pain and neck stiffness.   Skin: Negative.    Allergic/Immunologic: Negative.    Neurological: Negative.    Hematological: Negative.    Psychiatric/Behavioral: Negative.        Objective     VITALS: /89 (BP Location: Left arm, Patient Position: Sitting, Cuff Size: Adult)    "Pulse 56   Resp 18   Ht 182.9 cm (72\")   Wt 107 kg (236 lb 9.6 oz)   SpO2 95%   BMI 32.09 kg/m²     LABS:   Lab Results (most recent)     None          IMAGING:   No Images in the past 120 days found..    EXAM:  Physical Exam  Constitutional:       Appearance: He is well-developed.   HENT:      Head: Normocephalic and atraumatic.   Eyes:      Pupils: Pupils are equal, round, and reactive to light.   Neck:      Thyroid: No thyromegaly.      Vascular: No JVD.   Cardiovascular:      Rate and Rhythm: Normal rate and regular rhythm.      Chest Wall: PMI is not displaced.      Pulses: Normal pulses.      Heart sounds: Normal heart sounds, S1 normal and S2 normal. No murmur heard.    No friction rub. No gallop.   Pulmonary:      Effort: Pulmonary effort is normal. No respiratory distress.      Breath sounds: Normal breath sounds. No stridor. No wheezing or rales.   Chest:      Chest wall: No tenderness.   Abdominal:      General: Bowel sounds are normal. There is no distension.      Palpations: Abdomen is soft. There is no mass.      Tenderness: There is no abdominal tenderness. There is no guarding or rebound.   Musculoskeletal:      Cervical back: Neck supple. No edema.   Skin:     General: Skin is warm and dry.      Coloration: Skin is not pale.      Findings: No erythema or rash.   Neurological:      Mental Status: He is alert and oriented to person, place, and time.      Cranial Nerves: No cranial nerve deficit.      Coordination: Coordination normal.   Psychiatric:         Behavior: Behavior normal.         Procedure   Procedures       Assessment & Plan     Diagnoses and all orders for this visit:    1. Pre-operative cardiovascular examination (Primary)    2. Precordial chest pain    3. PVC's (premature ventricular contractions)    4. Essential hypertension    5. Dyslipidemia    6. Prediabetes    7. Cigarette nicotine dependence in remission    1.  I reviewed the EKG done on April 28 with frequent premature " ventricular complexes and couplets as well as bigeminy's and trigeminy's this seems to be of right ventricular origin I am going to get an event monitor for assessment of the arrhythmia of any runs of ventricular tachycardia also going to proceed with a stress testing for assessment of ischemia going to do pharmacological stress testing as he has significant low back pain we will get an echocardiogram to assess cardiac function wall motion and valve morphology  2.  His blood pressure today is elevated but I will monitor his blood pressure this is his first visit today  3.  He does have history of hyperlipidemia and prediabetes Dr. Carreon his primary care physician is monitoring his labs  4.  Quit smoking 1996    No follow-ups on file.      Advance Care Planning   ACP discussion was held with the patient during this visit. Patient has an advance directive (not in EMR), copy requested.             MEDS ORDERED DURING VISIT:  No orders of the defined types were placed in this encounter.      As always, Juanis Carreon MD  I appreciate very much the opportunity to participate in the cardiovascular care of your patients. Please do not hesitate to call me with any questions with regards to Williams BARRAZA Guido evaluation and management.         This document has been electronically signed by Roro Blackmon MD  May 23, 2023 12:26 EDT    This note is dictated utilizing voice recognition software.

## 2023-05-23 NOTE — TELEPHONE ENCOUNTER
Lamont called from [facility] called with ABN reading. Pt had some sinus rhythm with trigeminal PVCs.

## 2023-05-23 NOTE — TELEPHONE ENCOUNTER
Report is in pt's chart.    For information on Fall & Injury Prevention, visit [email or web address]

## 2023-05-26 ENCOUNTER — TELEPHONE (OUTPATIENT)
Dept: CARDIOLOGY | Facility: CLINIC | Age: 73
End: 2023-05-26

## 2023-05-26 NOTE — TELEPHONE ENCOUNTER
Oksana called sated they received a patient triggered report c/o chest pressure and the reports shows 21 PVC. They are faxing report      UNSIGNED REPORT SCANNED IN FOR REVIEW

## 2023-05-31 ENCOUNTER — OFFICE VISIT (OUTPATIENT)
Dept: CARDIOLOGY | Facility: CLINIC | Age: 73
End: 2023-05-31

## 2023-05-31 VITALS
WEIGHT: 239.8 LBS | BODY MASS INDEX: 32.48 KG/M2 | HEIGHT: 72 IN | OXYGEN SATURATION: 97 % | DIASTOLIC BLOOD PRESSURE: 82 MMHG | HEART RATE: 59 BPM | SYSTOLIC BLOOD PRESSURE: 139 MMHG

## 2023-05-31 DIAGNOSIS — I48.92 PAROXYSMAL ATRIAL FLUTTER: Primary | ICD-10-CM

## 2023-05-31 DIAGNOSIS — I10 ESSENTIAL HYPERTENSION: ICD-10-CM

## 2023-05-31 DIAGNOSIS — E78.5 DYSLIPIDEMIA: ICD-10-CM

## 2023-05-31 DIAGNOSIS — I47.29 NSVT (NONSUSTAINED VENTRICULAR TACHYCARDIA): ICD-10-CM

## 2023-05-31 PROCEDURE — 1159F MED LIST DOCD IN RCRD: CPT | Performed by: NURSE PRACTITIONER

## 2023-05-31 PROCEDURE — 3075F SYST BP GE 130 - 139MM HG: CPT | Performed by: NURSE PRACTITIONER

## 2023-05-31 PROCEDURE — 1160F RVW MEDS BY RX/DR IN RCRD: CPT | Performed by: NURSE PRACTITIONER

## 2023-05-31 PROCEDURE — 3079F DIAST BP 80-89 MM HG: CPT | Performed by: NURSE PRACTITIONER

## 2023-05-31 PROCEDURE — 99214 OFFICE O/P EST MOD 30 MIN: CPT | Performed by: NURSE PRACTITIONER

## 2023-05-31 RX ORDER — TAMSULOSIN HYDROCHLORIDE 0.4 MG/1
CAPSULE ORAL
COMMUNITY
Start: 2023-05-25

## 2023-05-31 NOTE — PROGRESS NOTES
[facility]             ClaudineMARK Cohn Jackie D, MD  Williams Lora  1950 05/31/2023    Patient Active Problem List   Diagnosis   • Postlaminectomy syndrome of lumbar region   • Lumbar facet arthropathy   • Hx of migraine headaches   • Moderate obesity   • Insomnia   • Essential hypertension   • Physical deconditioning   • Dyslipidemia   • Pre-operative cardiovascular examination   • Precordial chest pain   • PVC's (premature ventricular contractions)   • Prediabetes   • Cigarette nicotine dependence in remission       Dear Juanis Carreon MD:    Subjective     Chief Complaint   Patient presents with   • Results     ABNORMAL EVENT       HPI:     This is a 72 y.o. male with known past medical history of essential hypertension, PVCs, tobacco abuse and dyslipidemia.    Williams Lora presents today for abnormal event monitor readings.  Patient had abnormal event monitor readings showing frequent PVCs with runs of ventricular tachycardia up to 5 beats with paroxysmal atrial flutter.  Patient denies any palpitations, dizziness or syncope.  Does have some intermittent pains in his left chest that he describes as a pinching sensation.  He is scheduled for nuclear stress test and echocardiogram which have been scheduled but not yet performed.  Noted to be having frequent PVCs on examination today.  Blood pressure has been overall controlled.  Still awaiting home sleep study equipment.    • Diagnostic Testing  1. Abnormal event monitor reading showing multiple runs of ventricular tachycardia, PVCs and atrial flutter     All other systems were reviewed and were negative.    Patient Active Problem List   Diagnosis   • Postlaminectomy syndrome of lumbar region   • Lumbar facet arthropathy   • Hx of migraine headaches   • Moderate obesity   • Insomnia   • Essential hypertension   • Physical deconditioning   • Dyslipidemia   • Pre-operative cardiovascular  examination   • Precordial chest pain   • PVC's (premature ventricular contractions)   • Prediabetes   • Cigarette nicotine dependence in remission       family history includes Heart disease in his mother; Pulmonary fibrosis in his father.     reports that he quit smoking about 26 years ago. His smoking use included cigarettes. He has never used smokeless tobacco. He reports that he does not drink alcohol and does not use drugs.    No Known Allergies      Current Outpatient Medications:   •  amLODIPine (NORVASC) 10 MG tablet, TAKE 1 TABLET BY MOUTH EVERY DAY, Disp: , Rfl: 3  •  apixaban (ELIQUIS) 5 MG tablet tablet, Take 1 tablet by mouth 2 (Two) Times a Day., Disp: 60 tablet, Rfl: 3  •  indomethacin (INDOCIN) 50 MG capsule, Take 1 capsule by mouth 3 (Three) Times a Day As Needed for Mild Pain  or Moderate Pain ., Disp: 30 capsule, Rfl: 0  •  metoprolol tartrate (LOPRESSOR) 25 MG tablet, Take 0.5 tablets by mouth 2 (Two) Times a Day., Disp: 180 tablet, Rfl: 3  •  oxyCODONE-acetaminophen (PERCOCET) 7.5-325 MG per tablet, Take 1 tablet by mouth 2 (Two) Times a Day., Disp: , Rfl:   •  pravastatin (PRAVACHOL) 40 MG tablet, Take 1 tablet by mouth Daily., Disp: , Rfl:   •  tamsulosin (FLOMAX) 0.4 MG capsule 24 hr capsule, , Disp: , Rfl:   •  traZODone (DESYREL) 50 MG tablet, TAKE 1 TO 3 TABLETS AT BEDTIME., Disp: , Rfl: 2      Physical Exam:  I have reviewed the patient's current vital signs as documented in the patient's EMR.   Vitals:    05/31/23 0940   BP: 139/82   Pulse: 59   SpO2: 97%     Body mass index is 32.52 kg/m².       05/31/23  0940   Weight: 109 kg (239 lb 12.8 oz)      Physical Exam  Constitutional:       General: He is not in acute distress.     Appearance: Normal appearance. He is well-developed.   HENT:      Head: Normocephalic and atraumatic.      Mouth/Throat:      Mouth: Mucous membranes are moist.   Eyes:      Extraocular Movements: Extraocular movements intact.      Pupils: Pupils are equal, round,  and reactive to light.   Neck:      Vascular: No JVD.   Cardiovascular:      Rate and Rhythm: Normal rate and regular rhythm.      Heart sounds: Normal heart sounds. No murmur heard.    No S3 or S4 sounds.   Pulmonary:      Effort: Pulmonary effort is normal. No respiratory distress.      Breath sounds: Normal breath sounds. No wheezing.   Abdominal:      General: Bowel sounds are normal. There is no distension.      Palpations: Abdomen is soft. There is no hepatomegaly.      Tenderness: There is no abdominal tenderness.   Musculoskeletal:         General: Normal range of motion.      Cervical back: Normal range of motion and neck supple.   Skin:     General: Skin is warm and dry.      Coloration: Skin is not jaundiced or pale.   Neurological:      General: No focal deficit present.      Mental Status: He is alert and oriented to person, place, and time. Mental status is at baseline.   Psychiatric:         Mood and Affect: Mood normal.         Behavior: Behavior normal.         Thought Content: Thought content normal.         Judgment: Judgment normal.            DATA REVIEWED:     TTE/ANTONIO:      Laboratory evaluations:    Lab Results   Component Value Date    GLUCOSE 171 (H) 02/25/2019    BUN 11 02/25/2019    CREATININE 1.06 02/25/2019    EGFRIFNONA 69 02/25/2019    EGFRIFAFRI 76 10/03/2017    BCR 10.4 02/25/2019    K 4.4 02/25/2019    CO2 25.2 02/25/2019    CALCIUM 9.6 02/25/2019    PROTENTOTREF 7.2 10/03/2017    ALBUMIN 4.90 (H) 02/25/2019    LABIL2 1.9 10/03/2017    AST 46 (H) 02/25/2019    ALT 83 (H) 02/25/2019     Lab Results   Component Value Date    WBC 14.02 (H) 02/25/2019    HGB 15.4 02/25/2019    HCT 44.6 02/25/2019    MCV 86.8 02/25/2019     02/25/2019     Lab Results   Component Value Date    CHLPL 158 10/03/2017    TRIG 161 (H) 10/03/2017    HDL 34 (L) 10/03/2017    LDL 92 10/03/2017     No results found for: TSH, N2BDQAP, H2JAIQV, THYROIDAB  No results found for: HGBA1C  Lab Results   Component  Value Date    ALT 83 (H) 02/25/2019     No results found for: HGBA1C  Lab Results   Component Value Date    CREATININE 1.06 02/25/2019     No results found for: IRON, TIBC, FERRITIN  Lab Results   Component Value Date    INR 0.92 06/21/2016    PROTIME 10.0 06/21/2016           --------------------------------------------------------------------------------------------------------------------------    ASSESSMENT/PLAN:      Diagnosis Plan   1. Paroxysmal atrial flutter  apixaban (ELIQUIS) 5 MG tablet tablet    CBC (No Diff)    Basic Metabolic Panel    metoprolol tartrate (LOPRESSOR) 25 MG tablet      2. Dyslipidemia  Lipid Panel      3. NSVT (nonsustained ventricular tachycardia)  Magnesium      4. Essential hypertension            1. NSVT  2. Frequent PVCs  3. Paroxysmal atrial flutter  • Patient with abnormal event monitor readings with frequent PVCs, runs of ventricular tachycardia up to 5 beats and paroxysmal atrial flutter which is a new diagnosis for him.  Discussed with him in detail about the need for anticoagulation given his [id number] score of 2 for hypertension and age.  He is agreeable to this.  Start Eliquis 5 mg p.o. twice daily for stroke prevention.  Arrhythmias could be secondary to sleep apnea therefore home sleep study was ordered and patient is awaiting equipment.  Also scheduled for nuclear stress test and echocardiogram to rule out any ischemia and look for any structural heart disease.  Obtain CMP, CBC and magnesium to evaluate for electrolyte abnormalities.  Patient's heart rate documented to be in the 50s today however he is having frequent PVCs and manual count of heart rate appears to be in the 70s.  Start him on low-dose beta-blocker with metoprolol tartrate 12.5 mg for now and titrate up as tolerated.    4. Dyslipidemia  • Obtain lipid panel.  Adjust statin as needed.    5.  Essential hypertension  · Blood pressure overall controlled.  Continue with amlodipine and metoprolol.      This  document has been @Electronically signed by MARK Gilmore, 05/31/23, 9:22 AM EDT.       Dictated Utilizing Dragon Dictation: Part of this note may be an electronic transcription/translation of spoken language to printed text using the Dragon Dictation System.    Follow-up appointment and medication changes provided in hand delivered After Visit Summary as well as reviewed in the room.

## 2023-06-02 LAB
BUN SERPL-MCNC: 19 MG/DL (ref 8–27)
BUN/CREAT SERPL: 15 (ref 10–24)
CALCIUM SERPL-MCNC: 9.7 MG/DL (ref 8.6–10.2)
CHLORIDE SERPL-SCNC: 105 MMOL/L (ref 96–106)
CHOLEST SERPL-MCNC: 151 MG/DL (ref 100–199)
CO2 SERPL-SCNC: 23 MMOL/L (ref 20–29)
CREAT SERPL-MCNC: 1.25 MG/DL (ref 0.76–1.27)
EGFRCR SERPLBLD CKD-EPI 2021: 61 ML/MIN/1.73
ERYTHROCYTE [DISTWIDTH] IN BLOOD BY AUTOMATED COUNT: 12.7 % (ref 11.6–15.4)
GLUCOSE SERPL-MCNC: 113 MG/DL (ref 70–99)
HCT VFR BLD AUTO: 41.6 % (ref 37.5–51)
HDLC SERPL-MCNC: 46 MG/DL
HGB BLD-MCNC: 14.4 G/DL (ref 13–17.7)
LDLC SERPL CALC-MCNC: 87 MG/DL (ref 0–99)
MAGNESIUM SERPL-MCNC: 2 MG/DL (ref 1.6–2.3)
MCH RBC QN AUTO: 29.4 PG (ref 26.6–33)
MCHC RBC AUTO-ENTMCNC: 34.6 G/DL (ref 31.5–35.7)
MCV RBC AUTO: 85 FL (ref 79–97)
PLATELET # BLD AUTO: 221 X10E3/UL (ref 150–450)
POTASSIUM SERPL-SCNC: 4.3 MMOL/L (ref 3.5–5.2)
RBC # BLD AUTO: 4.89 X10E6/UL (ref 4.14–5.8)
SODIUM SERPL-SCNC: 144 MMOL/L (ref 134–144)
TRIGL SERPL-MCNC: 98 MG/DL (ref 0–149)
VLDLC SERPL CALC-MCNC: 18 MG/DL (ref 5–40)
WBC # BLD AUTO: 8.3 X10E3/UL (ref 3.4–10.8)

## 2023-06-06 ENCOUNTER — TREATMENT (OUTPATIENT)
Dept: CARDIOLOGY | Facility: CLINIC | Age: 73
End: 2023-06-06
Payer: MEDICARE

## 2023-06-06 DIAGNOSIS — I49.3 PVC'S (PREMATURE VENTRICULAR CONTRACTIONS): Primary | ICD-10-CM

## 2023-07-05 PROBLEM — I48.92 PAROXYSMAL ATRIAL FLUTTER: Status: ACTIVE | Noted: 2023-07-05

## 2023-07-05 NOTE — H&P (VIEW-ONLY)
[facility]             [facility] MARK Leyva Jackie D, MD  Williams Lora  1950 07/05/2023    Patient Active Problem List   Diagnosis    Postlaminectomy syndrome of lumbar region    Lumbar facet arthropathy    Hx of migraine headaches    Moderate obesity    Insomnia    Essential hypertension    Physical deconditioning    Dyslipidemia    Pre-operative cardiovascular examination    Precordial chest pain    PVC's (premature ventricular contractions)    Prediabetes    Cigarette nicotine dependence in remission    Paroxysmal atrial flutter       Dear Juanis Carreon MD:    Subjective     Chief Complaint   Patient presents with    Results     Stress and Echo       HPI:     This is a 73 y.o. male with known past medical history of essential hypertension, PVCs, tobacco abuse and dyslipidemia.     Williams Lora presents today for routine cardiology follow up.  Patient states he has been doing overall well since his last visit.  States he has gained around 40 pounds due to sedentary lifestyle due to back pain.  Denies any palpitations or chest pain.  Blood pressure has been stable.  Recent lab work showed stable kidney function electrolytes along with controlled cholesterol.  Echocardiogram showed normal LV function.  Nuclear stress test showed possible attenuation artifact versus small apical ischemia.  Not received home sleep study equipment yet.    Diagnostic Testing  Abnormal event monitor reading showing multiple runs of ventricular tachycardia, PVCs and atrial flutter  Echocardiogram 6/2023: EF 56 to 60%  Nuclear stress test 6/2023: Mostly reverse [seen at the rest images] and through lateral and inferior wall defect starts at the mid walls extending to the distal walls with slight fixation at the distal inferior wall with normal wall motion the above wall defects are consistent with GI attenuation artifact which has been seen in this study, there was a  small reversible apical wall defect this could also related to attenuation artifact versus small apical ischemia, TID 0.88.   Cardiac event monitor 5/2023: Normal sinus rhythm with frequent PVCs frequent runs of ventricular tachycardia up to 7 beats and possibole atrial flutter     All other systems were reviewed and were negative.    Patient Active Problem List   Diagnosis    Postlaminectomy syndrome of lumbar region    Lumbar facet arthropathy    Hx of migraine headaches    Moderate obesity    Insomnia    Essential hypertension    Physical deconditioning    Dyslipidemia    Pre-operative cardiovascular examination    Precordial chest pain    PVC's (premature ventricular contractions)    Prediabetes    Cigarette nicotine dependence in remission    Paroxysmal atrial flutter       family history includes Heart disease in his mother; Pulmonary fibrosis in his father.     reports that he quit smoking about 26 years ago. His smoking use included cigarettes. He has never used smokeless tobacco. He reports that he does not drink alcohol and does not use drugs.    No Known Allergies      Current Outpatient Medications:     amLODIPine (NORVASC) 10 MG tablet, TAKE 1 TABLET BY MOUTH EVERY DAY, Disp: , Rfl: 3    apixaban (ELIQUIS) 5 MG tablet tablet, Take 1 tablet by mouth 2 (Two) Times a Day., Disp: 60 tablet, Rfl: 3    indomethacin (INDOCIN) 50 MG capsule, Take 1 capsule by mouth 3 (Three) Times a Day As Needed for Mild Pain  or Moderate Pain ., Disp: 30 capsule, Rfl: 0    metoprolol tartrate (LOPRESSOR) 25 MG tablet, Take 0.5 tablets by mouth 2 (Two) Times a Day., Disp: 180 tablet, Rfl: 3    oxyCODONE-acetaminophen (PERCOCET) 7.5-325 MG per tablet, Take 1 tablet by mouth 2 (Two) Times a Day., Disp: , Rfl:     pravastatin (PRAVACHOL) 40 MG tablet, Take 1 tablet by mouth Daily., Disp: , Rfl:     tamsulosin (FLOMAX) 0.4 MG capsule 24 hr capsule, , Disp: , Rfl:     traZODone (DESYREL) 50 MG tablet, TAKE 1 TO 3 TABLETS AT  BEDTIME., Disp: , Rfl: 2      Physical Exam:  I have reviewed the patient's current vital signs as documented in the patient's EMR.   Vitals:    07/05/23 1117   BP: 117/67   Pulse: 83   Resp: 18   SpO2: 95%     Body mass index is 32.41 kg/m².       07/05/23  1117   Weight: 108 kg (239 lb)      Physical Exam  Constitutional:       General: He is not in acute distress.     Appearance: Normal appearance. He is well-developed.   HENT:      Head: Normocephalic and atraumatic.      Mouth/Throat:      Mouth: Mucous membranes are moist.   Eyes:      Extraocular Movements: Extraocular movements intact.      Pupils: Pupils are equal, round, and reactive to light.   Neck:      Vascular: No JVD.   Cardiovascular:      Rate and Rhythm: Normal rate and regular rhythm.      Heart sounds: Normal heart sounds. No murmur heard.    No S3 or S4 sounds.   Pulmonary:      Effort: Pulmonary effort is normal. No respiratory distress.      Breath sounds: Normal breath sounds. No wheezing.   Abdominal:      General: Bowel sounds are normal. There is no distension.      Palpations: Abdomen is soft. There is no hepatomegaly.      Tenderness: There is no abdominal tenderness.   Musculoskeletal:         General: Normal range of motion.      Cervical back: Normal range of motion and neck supple.   Skin:     General: Skin is warm and dry.      Coloration: Skin is not jaundiced or pale.   Neurological:      General: No focal deficit present.      Mental Status: He is alert and oriented to person, place, and time. Mental status is at baseline.   Psychiatric:         Mood and Affect: Mood normal.         Behavior: Behavior normal.         Thought Content: Thought content normal.         Judgment: Judgment normal.          DATA REVIEWED:     TTE/ANTONIO:  Results for orders placed during the hospital encounter of 06/27/23    Adult Transthoracic Echo Complete w/ Color, Spectral and Contrast if necessary per protocol    Interpretation Summary    Left  ventricular systolic function is normal. Left ventricular ejection fraction appears to be 56 - 60%.    Left ventricular diastolic function is consistent with (grade I) impaired relaxation.    The right ventricular cavity is borderline dilated.    The left atrial cavity is mild to moderately dilated.    The right atrial cavity is mildly  dilated.    There is mild calcification of the aortic valve mainly affecting the right coronary cusp(s).    Estimated right ventricular systolic pressure from tricuspid regurgitation is normal (<35 mmHg).      Laboratory evaluations:    Lab Results   Component Value Date    GLUCOSE 113 (H) 06/01/2023    BUN 19 06/01/2023    CREATININE 1.25 06/01/2023    EGFRIFNONA 69 02/25/2019    EGFRIFAFRI 76 10/03/2017    BCR 15 06/01/2023    K 4.3 06/01/2023    CO2 23 06/01/2023    CALCIUM 9.7 06/01/2023    PROTENTOTREF 7.2 10/03/2017    ALBUMIN 4.90 (H) 02/25/2019    LABIL2 1.9 10/03/2017    AST 46 (H) 02/25/2019    ALT 83 (H) 02/25/2019     Lab Results   Component Value Date    WBC 8.3 06/01/2023    HGB 14.4 06/01/2023    HCT 41.6 06/01/2023    MCV 85 06/01/2023     06/01/2023     Lab Results   Component Value Date    CHLPL 151 06/01/2023    TRIG 98 06/01/2023    HDL 46 06/01/2023    LDL 87 06/01/2023     No results found for: TSH, Y9DPFFI, C8ZAUAA, THYROIDAB  No results found for: HGBA1C  Lab Results   Component Value Date    ALT 83 (H) 02/25/2019     No results found for: HGBA1C  Lab Results   Component Value Date    CREATININE 1.25 06/01/2023     No results found for: IRON, TIBC, FERRITIN  Lab Results   Component Value Date    INR 0.92 06/21/2016    PROTIME 10.0 06/21/2016           --------------------------------------------------------------------------------------------------------------------------    ASSESSMENT/PLAN:      Diagnosis Plan   1. Obstructive sleep apnea  Home Sleep Study      2. Dyslipidemia        3. PVC's (premature ventricular contractions)        4. Paroxysmal  atrial flutter        5. Essential hypertension            Paroxysmal atrial flutter  Frequent PVCs  NSVT  Cardiac event monitor showed normal normal sinus rhythm with runs of atrial flutter and frequent PVCs with episodes of ventricular tachycardia.  Was started on low-dose metoprolol at last visit and is tolerating well.  Overall asymptomatic.  Echocardiogram showed normal LV function.  Stress test showed attenuation artifact versus small apical ischemia.  Denies any chest pain.  Lab work stable.  Continue with Eliquis and metoprolol for now.  Continue to monitor.  Noted to have PVC burden of 26% on monitor.  Offered referral to EP due to burden. He is agreeable to this.    Dyslipidemia  Recently panel showed LDL at goal.  Continue statin.    5.  Essential hypertension  Blood pressure controlled.  Continue current management.      This document has been @Electronically signed by MARK Gilmore, 07/05/23, 8:50 AM EDT.       Dictated Utilizing Dragon Dictation: Part of this note may be an electronic transcription/translation of spoken language to printed text using the Dragon Dictation System.    Follow-up appointment and medication changes provided in hand delivered After Visit Summary as well as reviewed in the room.

## 2023-07-10 PROBLEM — I47.29 NSVT (NONSUSTAINED VENTRICULAR TACHYCARDIA): Status: ACTIVE | Noted: 2023-07-10

## 2023-07-28 ENCOUNTER — HOSPITAL ENCOUNTER (OUTPATIENT)
Facility: HOSPITAL | Age: 73
Setting detail: HOSPITAL OUTPATIENT SURGERY
Discharge: HOME | End: 2023-07-28
Attending: SPECIALIST | Admitting: SPECIALIST
Payer: MEDICARE

## 2023-07-28 VITALS
TEMPERATURE: 98 F | OXYGEN SATURATION: 96 % | DIASTOLIC BLOOD PRESSURE: 82 MMHG | WEIGHT: 232 LBS | BODY MASS INDEX: 31.42 KG/M2 | RESPIRATION RATE: 18 BRPM | SYSTOLIC BLOOD PRESSURE: 145 MMHG | HEART RATE: 62 BPM | HEIGHT: 72 IN

## 2023-07-28 DIAGNOSIS — I48.92 PAROXYSMAL ATRIAL FLUTTER: ICD-10-CM

## 2023-07-28 DIAGNOSIS — R07.2 PRECORDIAL CHEST PAIN: ICD-10-CM

## 2023-07-28 PROCEDURE — 80061 LIPID PANEL: CPT | Performed by: NURSE PRACTITIONER

## 2023-07-28 PROCEDURE — C1894 INTRO/SHEATH, NON-LASER: HCPCS | Performed by: SPECIALIST

## 2023-07-28 PROCEDURE — 83735 ASSAY OF MAGNESIUM: CPT | Performed by: NURSE PRACTITIONER

## 2023-07-28 PROCEDURE — 25010000002 MIDAZOLAM PER 1 MG: Performed by: SPECIALIST

## 2023-07-28 PROCEDURE — 80048 BASIC METABOLIC PNL TOTAL CA: CPT | Performed by: NURSE PRACTITIONER

## 2023-07-28 PROCEDURE — 25010000002 NITROGLYCERIN 100-5 MCG/ML-% SOLUTION: Performed by: SPECIALIST

## 2023-07-28 PROCEDURE — C1769 GUIDE WIRE: HCPCS | Performed by: SPECIALIST

## 2023-07-28 PROCEDURE — 25010000002 HEPARIN (PORCINE) PER 1000 UNITS: Performed by: SPECIALIST

## 2023-07-28 PROCEDURE — 85027 COMPLETE CBC AUTOMATED: CPT | Performed by: NURSE PRACTITIONER

## 2023-07-28 PROCEDURE — 25010000002 FENTANYL CITRATE (PF) 50 MCG/ML SOLUTION: Performed by: SPECIALIST

## 2023-07-28 PROCEDURE — 25510000001 IOPAMIDOL PER 1 ML: Performed by: SPECIALIST

## 2023-07-28 PROCEDURE — 93458 L HRT ARTERY/VENTRICLE ANGIO: CPT | Performed by: SPECIALIST

## 2023-07-28 RX ORDER — FENTANYL CITRATE 50 UG/ML
INJECTION, SOLUTION INTRAMUSCULAR; INTRAVENOUS
Status: DISCONTINUED | OUTPATIENT
Start: 2023-07-28 | End: 2023-07-28 | Stop reason: HOSPADM

## 2023-07-28 RX ORDER — MIDAZOLAM HYDROCHLORIDE 1 MG/ML
INJECTION INTRAMUSCULAR; INTRAVENOUS
Status: DISCONTINUED | OUTPATIENT
Start: 2023-07-28 | End: 2023-07-28 | Stop reason: HOSPADM

## 2023-07-28 RX ORDER — LIDOCAINE HYDROCHLORIDE 20 MG/ML
INJECTION, SOLUTION INFILTRATION; PERINEURAL
Status: DISCONTINUED | OUTPATIENT
Start: 2023-07-28 | End: 2023-07-28 | Stop reason: HOSPADM

## 2023-07-28 RX ORDER — SODIUM CHLORIDE 9 MG/ML
INJECTION, SOLUTION INTRAVENOUS
Status: COMPLETED | OUTPATIENT
Start: 2023-07-28 | End: 2023-07-28

## 2023-07-28 RX ORDER — HEPARIN SODIUM 1000 [USP'U]/ML
INJECTION, SOLUTION INTRAVENOUS; SUBCUTANEOUS
Status: DISCONTINUED | OUTPATIENT
Start: 2023-07-28 | End: 2023-07-28 | Stop reason: HOSPADM

## 2023-07-28 RX ORDER — SODIUM CHLORIDE 9 MG/ML
40 INJECTION, SOLUTION INTRAVENOUS AS NEEDED
Status: CANCELLED | OUTPATIENT
Start: 2023-07-28

## 2023-07-28 RX ORDER — SODIUM CHLORIDE 0.9 % (FLUSH) 0.9 %
10 SYRINGE (ML) INJECTION AS NEEDED
Status: CANCELLED | OUTPATIENT
Start: 2023-07-28

## 2023-07-28 RX ORDER — SODIUM CHLORIDE 0.9 % (FLUSH) 0.9 %
10 SYRINGE (ML) INJECTION EVERY 12 HOURS SCHEDULED
Status: CANCELLED | OUTPATIENT
Start: 2023-07-28

## 2023-07-28 RX ORDER — VERAPAMIL HYDROCHLORIDE 2.5 MG/ML
INJECTION, SOLUTION INTRAVENOUS
Status: DISCONTINUED | OUTPATIENT
Start: 2023-07-28 | End: 2023-07-28 | Stop reason: HOSPADM

## 2023-07-28 NOTE — DISCHARGE INSTR - ACTIVITY
Take it easy for the next several days to one week. No driving or signing legal documents for the next 24 hours. Keep cath site clean, dry, and covered. Do not submerge site underwater, no hot tubs, tub baths, or swimming pools for the next several days to one week. Dr. Blackmon said to resume taking eliquis on Sunday July 30 2023. I was unable to reach the staff at Dr. Blackmon's  office. Patient instructed to call office on Monday July 31 2023 to obtain a follow up appointment with Claudine FLORES for 2 weeks.

## 2023-07-28 NOTE — Clinical Note
No in lab complications Ordered previously entered to Neuro.  I thought I entered a referral to Rheumatology at her appt, but I did not do so.  I have now entered the referral to Rheumatology as well

## 2023-07-28 NOTE — Clinical Note
Hemostasis started on the right radial artery. R-Band was used in achieving hemostasis. Radial compression device applied to vessel. Hemostasis achieved successfully. Closure device additional comment: 12ml air applied to TR band.

## 2024-02-12 ENCOUNTER — TELEPHONE (OUTPATIENT)
Dept: CARDIOLOGY | Facility: CLINIC | Age: 74
End: 2024-02-12
Payer: MEDICARE

## 2024-02-12 ENCOUNTER — OFFICE VISIT (OUTPATIENT)
Dept: CARDIOLOGY | Facility: CLINIC | Age: 74
End: 2024-02-12
Payer: MEDICARE

## 2024-02-12 VITALS
DIASTOLIC BLOOD PRESSURE: 66 MMHG | SYSTOLIC BLOOD PRESSURE: 118 MMHG | HEIGHT: 72 IN | HEART RATE: 90 BPM | BODY MASS INDEX: 28.09 KG/M2 | WEIGHT: 207.4 LBS | RESPIRATION RATE: 16 BRPM | OXYGEN SATURATION: 97 %

## 2024-02-12 DIAGNOSIS — I48.92 PAROXYSMAL ATRIAL FLUTTER: ICD-10-CM

## 2024-02-12 DIAGNOSIS — I47.29 NSVT (NONSUSTAINED VENTRICULAR TACHYCARDIA): ICD-10-CM

## 2024-02-12 DIAGNOSIS — I10 ESSENTIAL HYPERTENSION: Primary | ICD-10-CM

## 2024-02-12 DIAGNOSIS — I49.3 PVC'S (PREMATURE VENTRICULAR CONTRACTIONS): ICD-10-CM

## 2024-02-12 DIAGNOSIS — G47.33 OSA (OBSTRUCTIVE SLEEP APNEA): ICD-10-CM

## 2024-02-12 DIAGNOSIS — I25.10 ASCVD (ARTERIOSCLEROTIC CARDIOVASCULAR DISEASE): ICD-10-CM

## 2024-02-12 PROCEDURE — 1159F MED LIST DOCD IN RCRD: CPT | Performed by: NURSE PRACTITIONER

## 2024-02-12 PROCEDURE — 1160F RVW MEDS BY RX/DR IN RCRD: CPT | Performed by: NURSE PRACTITIONER

## 2024-02-12 PROCEDURE — 99214 OFFICE O/P EST MOD 30 MIN: CPT | Performed by: NURSE PRACTITIONER

## 2024-02-12 PROCEDURE — 3074F SYST BP LT 130 MM HG: CPT | Performed by: NURSE PRACTITIONER

## 2024-02-12 PROCEDURE — 3078F DIAST BP <80 MM HG: CPT | Performed by: NURSE PRACTITIONER

## 2024-02-12 PROCEDURE — 93000 ELECTROCARDIOGRAM COMPLETE: CPT | Performed by: NURSE PRACTITIONER

## 2024-02-13 RX ORDER — ROSUVASTATIN CALCIUM 20 MG/1
20 TABLET, COATED ORAL DAILY
Qty: 30 TABLET | Refills: 3 | Status: SHIPPED | OUTPATIENT
Start: 2024-02-13

## 2024-02-15 ENCOUNTER — HOSPITAL ENCOUNTER (EMERGENCY)
Facility: HOSPITAL | Age: 74
Discharge: HOME | End: 2024-02-15
Attending: EMERGENCY MEDICINE
Payer: MEDICARE

## 2024-02-15 ENCOUNTER — APPOINTMENT (OUTPATIENT)
Dept: CT IMAGING | Facility: HOSPITAL | Age: 74
End: 2024-02-15
Payer: MEDICARE

## 2024-02-15 ENCOUNTER — OFFICE VISIT (OUTPATIENT)
Dept: UROLOGY | Facility: CLINIC | Age: 74
End: 2024-02-15
Payer: MEDICARE

## 2024-02-15 VITALS
OXYGEN SATURATION: 99 % | RESPIRATION RATE: 16 BRPM | BODY MASS INDEX: 26.55 KG/M2 | TEMPERATURE: 97.6 F | DIASTOLIC BLOOD PRESSURE: 59 MMHG | SYSTOLIC BLOOD PRESSURE: 145 MMHG | HEART RATE: 77 BPM | HEIGHT: 72 IN | WEIGHT: 196 LBS

## 2024-02-15 VITALS
WEIGHT: 202 LBS | HEART RATE: 42 BPM | BODY MASS INDEX: 27.36 KG/M2 | SYSTOLIC BLOOD PRESSURE: 133 MMHG | HEIGHT: 72 IN | DIASTOLIC BLOOD PRESSURE: 76 MMHG

## 2024-02-15 DIAGNOSIS — N20.1 RIGHT URETERAL STONE: Primary | ICD-10-CM

## 2024-02-15 DIAGNOSIS — N20.0 BILATERAL KIDNEY STONES: ICD-10-CM

## 2024-02-15 DIAGNOSIS — N20.1 URETEROLITHIASIS: Primary | ICD-10-CM

## 2024-02-15 LAB
ALBUMIN SERPL-MCNC: 4.4 G/DL (ref 3.5–5.2)
ALBUMIN/GLOB SERPL: 1.4 G/DL
ALP SERPL-CCNC: 81 U/L (ref 39–117)
ALT SERPL W P-5'-P-CCNC: 15 U/L (ref 1–41)
ANION GAP SERPL CALCULATED.3IONS-SCNC: 15.3 MMOL/L (ref 5–15)
AST SERPL-CCNC: 20 U/L (ref 1–40)
BACTERIA UR QL AUTO: ABNORMAL /HPF
BASOPHILS # BLD AUTO: 0.04 10*3/MM3 (ref 0–0.2)
BASOPHILS NFR BLD AUTO: 0.4 % (ref 0–1.5)
BILIRUB SERPL-MCNC: 0.4 MG/DL (ref 0–1.2)
BILIRUB UR QL STRIP: NEGATIVE
BUN SERPL-MCNC: 30 MG/DL (ref 8–23)
BUN/CREAT SERPL: 13.8 (ref 7–25)
CALCIUM SPEC-SCNC: 10.1 MG/DL (ref 8.6–10.5)
CHLORIDE SERPL-SCNC: 101 MMOL/L (ref 98–107)
CLARITY UR: CLEAR
CO2 SERPL-SCNC: 22.7 MMOL/L (ref 22–29)
COLOR UR: YELLOW
CREAT SERPL-MCNC: 2.18 MG/DL (ref 0.76–1.27)
CRP SERPL-MCNC: 2.18 MG/DL (ref 0–0.5)
DEPRECATED RDW RBC AUTO: 41.1 FL (ref 37–54)
EGFRCR SERPLBLD CKD-EPI 2021: 31.2 ML/MIN/1.73
EOSINOPHIL # BLD AUTO: 0.07 10*3/MM3 (ref 0–0.4)
EOSINOPHIL NFR BLD AUTO: 0.7 % (ref 0.3–6.2)
ERYTHROCYTE [DISTWIDTH] IN BLOOD BY AUTOMATED COUNT: 11.9 % (ref 12.3–15.4)
ERYTHROCYTE [SEDIMENTATION RATE] IN BLOOD: 42 MM/HR (ref 0–20)
GLOBULIN UR ELPH-MCNC: 3.2 GM/DL
GLUCOSE SERPL-MCNC: 122 MG/DL (ref 65–99)
GLUCOSE UR STRIP-MCNC: NEGATIVE MG/DL
HCT VFR BLD AUTO: 44.2 % (ref 37.5–51)
HGB BLD-MCNC: 14.2 G/DL (ref 13–17.7)
HGB UR QL STRIP.AUTO: ABNORMAL
HOLD SPECIMEN: NORMAL
HOLD SPECIMEN: NORMAL
HYALINE CASTS UR QL AUTO: ABNORMAL /LPF
IMM GRANULOCYTES # BLD AUTO: 0.04 10*3/MM3 (ref 0–0.05)
IMM GRANULOCYTES NFR BLD AUTO: 0.4 % (ref 0–0.5)
KETONES UR QL STRIP: ABNORMAL
LEUKOCYTE ESTERASE UR QL STRIP.AUTO: NEGATIVE
LIPASE SERPL-CCNC: 12 U/L (ref 13–60)
LYMPHOCYTES # BLD AUTO: 1.56 10*3/MM3 (ref 0.7–3.1)
LYMPHOCYTES NFR BLD AUTO: 15.6 % (ref 19.6–45.3)
MCH RBC QN AUTO: 30.1 PG (ref 26.6–33)
MCHC RBC AUTO-ENTMCNC: 32.1 G/DL (ref 31.5–35.7)
MCV RBC AUTO: 93.6 FL (ref 79–97)
MONOCYTES # BLD AUTO: 0.77 10*3/MM3 (ref 0.1–0.9)
MONOCYTES NFR BLD AUTO: 7.7 % (ref 5–12)
NEUTROPHILS NFR BLD AUTO: 7.52 10*3/MM3 (ref 1.7–7)
NEUTROPHILS NFR BLD AUTO: 75.2 % (ref 42.7–76)
NITRITE UR QL STRIP: NEGATIVE
NRBC BLD AUTO-RTO: 0 /100 WBC (ref 0–0.2)
PH UR STRIP.AUTO: 5.5 [PH] (ref 5–8)
PLATELET # BLD AUTO: 219 10*3/MM3 (ref 140–450)
PMV BLD AUTO: 10 FL (ref 6–12)
POTASSIUM SERPL-SCNC: 5 MMOL/L (ref 3.5–5.2)
PROT SERPL-MCNC: 7.6 G/DL (ref 6–8.5)
PROT UR QL STRIP: NEGATIVE
RBC # BLD AUTO: 4.72 10*6/MM3 (ref 4.14–5.8)
RBC # UR STRIP: ABNORMAL /HPF
REF LAB TEST METHOD: ABNORMAL
SODIUM SERPL-SCNC: 139 MMOL/L (ref 136–145)
SP GR UR STRIP: 1.02 (ref 1–1.03)
SQUAMOUS #/AREA URNS HPF: ABNORMAL /HPF
UROBILINOGEN UR QL STRIP: ABNORMAL
WBC # UR STRIP: ABNORMAL /HPF
WBC NRBC COR # BLD AUTO: 10 10*3/MM3 (ref 3.4–10.8)
WHOLE BLOOD HOLD COAG: NORMAL
WHOLE BLOOD HOLD SPECIMEN: NORMAL

## 2024-02-15 PROCEDURE — 86140 C-REACTIVE PROTEIN: CPT | Performed by: PHYSICIAN ASSISTANT

## 2024-02-15 PROCEDURE — 85025 COMPLETE CBC W/AUTO DIFF WBC: CPT | Performed by: PHYSICIAN ASSISTANT

## 2024-02-15 PROCEDURE — 85652 RBC SED RATE AUTOMATED: CPT | Performed by: PHYSICIAN ASSISTANT

## 2024-02-15 PROCEDURE — 74176 CT ABD & PELVIS W/O CONTRAST: CPT | Performed by: RADIOLOGY

## 2024-02-15 PROCEDURE — 81001 URINALYSIS AUTO W/SCOPE: CPT | Performed by: PHYSICIAN ASSISTANT

## 2024-02-15 PROCEDURE — 99284 EMERGENCY DEPT VISIT MOD MDM: CPT

## 2024-02-15 PROCEDURE — 36415 COLL VENOUS BLD VENIPUNCTURE: CPT

## 2024-02-15 PROCEDURE — 80053 COMPREHEN METABOLIC PANEL: CPT | Performed by: PHYSICIAN ASSISTANT

## 2024-02-15 PROCEDURE — 74176 CT ABD & PELVIS W/O CONTRAST: CPT

## 2024-02-15 PROCEDURE — 83690 ASSAY OF LIPASE: CPT | Performed by: PHYSICIAN ASSISTANT

## 2024-02-15 RX ORDER — PREGABALIN 150 MG/1
150 CAPSULE ORAL
COMMUNITY
Start: 2024-01-30 | End: 2024-02-16

## 2024-02-15 RX ORDER — GENTAMICIN SULFATE 80 MG/100ML
80 INJECTION, SOLUTION INTRAVENOUS ONCE
Status: CANCELLED | OUTPATIENT
Start: 2024-02-15 | End: 2024-02-15

## 2024-02-15 NOTE — ED PROVIDER NOTES
Subjective   History of Present Illness  73-year-old male presents secondary to right flank pain.  Patient's been having intermittent symptoms over the past 3 to 4 weeks.  He states has been a little worse over the past 3 to 4 days.  He declines anything for pain at this time.  He denies any fever.  Patient states that when then the pain has been severe he has had nausea vomiting.  He states he has been drinking a lot of water  He reports having history kidney stones and states this feels very similar to previous stones.  He also has a past medical history of hypertension, rheumatic fever, osteoarthritis and hyperlipidemia.  He presents by private vehicle.        Review of Systems   Constitutional: Negative.  Negative for fever.   HENT: Negative.     Respiratory: Negative.     Cardiovascular: Negative.  Negative for chest pain.   Gastrointestinal: Negative.  Negative for abdominal pain.   Endocrine: Negative.    Genitourinary:  Positive for flank pain. Negative for dysuria.   Skin: Negative.    Neurological: Negative.    Psychiatric/Behavioral: Negative.     All other systems reviewed and are negative.      Past Medical History:   Diagnosis Date    Arthritis     Chronic pain disorder     Depression     RECENT DEATH OF DAUGHTER    Extremity pain     Grief     Sudden loss of daughter in law 4 weeks ago    Headache     History of gastritis     History of gout     History of kidney stones     Hyperlipidemia     Hypertension     Low back pain     Lumbosacral disc disease     Migraines     Rheumatic fever     Wears glasses        No Known Allergies    Past Surgical History:   Procedure Laterality Date    CARDIAC CATHETERIZATION N/A 07/28/2023    Procedure: Left Heart Cath (Eliquis instructions per Dr. Fournier);  Surgeon: Roro Blackmon MD;  Location: [facility] CATH INVASIVE LOCATION;  Service: Cardiology;  Laterality: N/A;    CHOLECYSTECTOMY  2001    COLONOSCOPY      CYSTOSCOPY W/ LITHOLAPAXY / EHL  2004    KIDNEY STONE  SURGERY      LUMBAR FUSION  10/2013    posterior lumbar interbody fusion, L4-L5, Dr. Raad Rivera    SPINAL CORD STIMULATOR IMPLANT  2016    SPINAL CORD STIMULATOR IMPLANT N/A 2016    Procedure: SPINAL CORD STIMULATOR INSERTION PHASE 1;  Surgeon: aDny Ratliff MD;  Location: [facility];  Service:        Family History   Problem Relation Age of Onset    Heart disease Mother     Pulmonary fibrosis Father        Social History     Socioeconomic History    Marital status:    Tobacco Use    Smoking status: Former     Years: 20     Types: Cigarettes     Quit date: 1996     Years since quittin.5    Smokeless tobacco: Never   Vaping Use    Vaping Use: Never used   Substance and Sexual Activity    Alcohol use: No     Comment: Rarely    Drug use: No    Sexual activity: Defer           Objective   Physical Exam  Vitals and nursing note reviewed.   Constitutional:       General: He is not in acute distress.     Appearance: He is well-developed. He is not diaphoretic.   HENT:      Head: Normocephalic and atraumatic.      Right Ear: External ear normal.      Left Ear: External ear normal.      Nose: Nose normal.   Eyes:      Conjunctiva/sclera: Conjunctivae normal.      Pupils: Pupils are equal, round, and reactive to light.   Neck:      Vascular: No JVD.      Trachea: No tracheal deviation.   Cardiovascular:      Rate and Rhythm: Normal rate and regular rhythm.      Heart sounds: Normal heart sounds. No murmur heard.  Pulmonary:      Effort: Pulmonary effort is normal. No respiratory distress.      Breath sounds: Normal breath sounds. No wheezing.   Abdominal:      General: Bowel sounds are normal.      Palpations: Abdomen is soft.      Tenderness: There is no abdominal tenderness.   Musculoskeletal:         General: No deformity. Normal range of motion.      Cervical back: Normal range of motion and neck supple.   Skin:     General: Skin is warm and dry.      Coloration: Skin is not pale.       Findings: No erythema or rash.   Neurological:      Mental Status: He is alert and oriented to person, place, and time.      Cranial Nerves: No cranial nerve deficit.   Psychiatric:         Behavior: Behavior normal.         Thought Content: Thought content normal.         Procedures           ED Course  ED Course as of 02/15/24 1702   Thu Feb 15, 2024   1014 Reviewed with Shai Lawson with urology.  He will see patient at 1130 today. [JI]   1044 Reviewed with Shai Lawson.  Patient was given the option of being admitted for his SARAH versus follow-up with Shai today and have repeat labs tomorrow.  Dr. oCol is currently out of town.  He prefers to see Shai in the office today to get everything set up.  He was counseled at the signs and symptoms worsen all appropriate follow-up.  He was also counseled about the likely need of lab work tomorrow.  He voices understanding. [JI]      ED Course User Index  [JI] Alfa Toscano PA                                 Results for orders placed or performed during the hospital encounter of 02/15/24   Comprehensive Metabolic Panel    Specimen: Arm, Right; Blood   Result Value Ref Range    Glucose 122 (H) 65 - 99 mg/dL    BUN 30 (H) 8 - 23 mg/dL    Creatinine 2.18 (H) 0.76 - 1.27 mg/dL    Sodium 139 136 - 145 mmol/L    Potassium 5.0 3.5 - 5.2 mmol/L    Chloride 101 98 - 107 mmol/L    CO2 22.7 22.0 - 29.0 mmol/L    Calcium 10.1 8.6 - 10.5 mg/dL    Total Protein 7.6 6.0 - 8.5 g/dL    Albumin 4.4 3.5 - 5.2 g/dL    ALT (SGPT) 15 1 - 41 U/L    AST (SGOT) 20 1 - 40 U/L    Alkaline Phosphatase 81 39 - 117 U/L    Total Bilirubin 0.4 0.0 - 1.2 mg/dL    Globulin 3.2 gm/dL    A/G Ratio 1.4 g/dL    BUN/Creatinine Ratio 13.8 7.0 - 25.0    Anion Gap 15.3 (H) 5.0 - 15.0 mmol/L    eGFR 31.2 (L) >60.0 mL/min/1.73   Lipase    Specimen: Arm, Right; Blood   Result Value Ref Range    Lipase 12 (L) 13 - 60 U/L   Urinalysis With Culture If Indicated - Urine, Clean Catch    Specimen: Urine, Clean  Catch   Result Value Ref Range    Color, UA Yellow Yellow, Straw    Appearance, UA Clear Clear    pH, UA 5.5 5.0 - 8.0    Specific Gravity, UA 1.018 1.005 - 1.030    Glucose, UA Negative Negative    Ketones, UA 15 mg/dL (1+) (A) Negative    Bilirubin, UA Negative Negative    Blood, UA Large (3+) (A) Negative    Protein, UA Negative Negative    Leuk Esterase, UA Negative Negative    Nitrite, UA Negative Negative    Urobilinogen, UA 0.2 E.U./dL 0.2 - 1.0 E.U./dL   C-reactive Protein    Specimen: Arm, Right; Blood   Result Value Ref Range    C-Reactive Protein 2.18 (H) 0.00 - 0.50 mg/dL   Sedimentation Rate    Specimen: Arm, Right; Blood   Result Value Ref Range    Sed Rate 42 (H) 0 - 20 mm/hr   CBC Auto Differential    Specimen: Arm, Right; Blood   Result Value Ref Range    WBC 10.00 3.40 - 10.80 10*3/mm3    RBC 4.72 4.14 - 5.80 10*6/mm3    Hemoglobin 14.2 13.0 - 17.7 g/dL    Hematocrit 44.2 37.5 - 51.0 %    MCV 93.6 79.0 - 97.0 fL    MCH 30.1 26.6 - 33.0 pg    MCHC 32.1 31.5 - 35.7 g/dL    RDW 11.9 (L) 12.3 - 15.4 %    RDW-SD 41.1 37.0 - 54.0 fl    MPV 10.0 6.0 - 12.0 fL    Platelets 219 140 - 450 10*3/mm3    Neutrophil % 75.2 42.7 - 76.0 %    Lymphocyte % 15.6 (L) 19.6 - 45.3 %    Monocyte % 7.7 5.0 - 12.0 %    Eosinophil % 0.7 0.3 - 6.2 %    Basophil % 0.4 0.0 - 1.5 %    Immature Grans % 0.4 0.0 - 0.5 %    Neutrophils, Absolute 7.52 (H) 1.70 - 7.00 10*3/mm3    Lymphocytes, Absolute 1.56 0.70 - 3.10 10*3/mm3    Monocytes, Absolute 0.77 0.10 - 0.90 10*3/mm3    Eosinophils, Absolute 0.07 0.00 - 0.40 10*3/mm3    Basophils, Absolute 0.04 0.00 - 0.20 10*3/mm3    Immature Grans, Absolute 0.04 0.00 - 0.05 10*3/mm3    nRBC 0.0 0.0 - 0.2 /100 WBC   Urinalysis, Microscopic Only - Urine, Clean Catch    Specimen: Urine, Clean Catch   Result Value Ref Range    RBC, UA Too Numerous to Count (A) None Seen, 0-2 /HPF    WBC, UA 0-2 None Seen, 0-2 /HPF    Bacteria, UA None Seen None Seen /HPF    Squamous Epithelial Cells, UA None  Seen None Seen, 0-2 /HPF    Hyaline Casts, UA None Seen None Seen /LPF    Methodology Automated Microscopy    Green Top (Gel)   Result Value Ref Range    Extra Tube Hold for add-ons.    Lavender Top   Result Value Ref Range    Extra Tube hold for add-on    Gold Top - SST   Result Value Ref Range    Extra Tube Hold for add-ons.    Light Blue Top   Result Value Ref Range    Extra Tube Hold for add-ons.      CT Abdomen Pelvis Stone Protocol    Result Date: 2/15/2024   1. Obstructive uropathy on the right due to 2 right mid ureteral stones  2. Other findings as above         This report was finalized on 2/15/2024 10:07 AM by Dr. Oliverio Mccray MD.                 Medical Decision Making  73-year-old male presents secondary to right flank pain.  Patient's been having intermittent symptoms over the past 3 to 4 weeks.  He states has been a little worse over the past 3 to 4 days.  He declines anything for pain at this time.  He denies any fever.  Patient states that when then the pain has been severe he has had nausea vomiting.  He states he has been drinking a lot of water  He reports having history kidney stones and states this feels very similar to previous stones.  He also has a past medical history of hypertension, rheumatic fever, osteoarthritis and hyperlipidemia.  He presents by private vehicle.    Problems Addressed:  Ureterolithiasis: complicated acute illness or injury    Amount and/or Complexity of Data Reviewed  Labs: ordered. Decision-making details documented in ED Course.  Radiology: ordered. Decision-making details documented in ED Course.  Discussion of management or test interpretation with external provider(s): Shai Lawson.  Patient was given the option of being admitted versus outpatient follow-up today.  He preferred to have outpatient follow-up today.  They will repeat his labs tomorrow.    Risk  Risk Details: Counseled on his diagnostic and labs.  He was counseled on the need for follow-up.  He is  to go see urology immediately after here.  They will make arrangements for follow-up on his labs tomorrow.        Final diagnoses:   Ureterolithiasis       ED Disposition  ED Disposition       ED Disposition   Discharge    Condition   Stable    Comment   --               Shai Lawson PA-C  [address]  [place] [address]  [phone]    Today  11:30         Medication List      No changes were made to your prescriptions during this visit.            Alfa Toscano PA  02/15/24 3071

## 2024-02-15 NOTE — H&P (VIEW-ONLY)
"Chief Complaint:    Chief Complaint   Patient presents with    Nephrolithiasis       Vital Signs:   /76 (BP Location: Left arm, Patient Position: Sitting)   Pulse (!) 42   Ht 182.9 cm (72.01\")   Wt 91.6 kg (202 lb)   BMI 27.39 kg/m²   Body mass index is 27.39 kg/m².      HPI:  Williams Lora is a 73 y.o. male who presents today for initial evaluation     History of Present Illness  Mr. Paredes presents to the clinic for evaluation of nephrolithiasis.  Patient has a past medical history significant for chronic back pain, gout, several kidney stones requiring surgical intervention, hyperlipidemia, hypertension, and A-fib for which she is on chronic Eliquis.  Patient states that he began to have right-sided back and flank pain that was uncontrolled with his current as needed Percocet.  He went to the ER for evaluation and had a CT scan of the abdomen pelvis completed at that time that showed 2 proximal to mid right ureteral stones with the largest stone distally and measuring roughly 7 mm in size.  He had multiple nonobstructing intrarenal calculi bilaterally with largest stone measuring roughly 1 cm in size in the right midpole of the kidney.  Largest on the left was roughly 7 mm in size.  Patient states that he is underwent several surgeries in the past with his last one occurring in [facility] roughly 4 to 5 years ago.  He is scheduled to take Flomax daily but states he takes this irregularly.  He had a CBC completed at time of ER visit that showed normal white blood cell and H&H.  He had a CMP completed that showed an acute kidney injury.  His creatinine roughly 6 months ago was roughly 1.3 and had increased to 2.18.  GFR at that time was 59 and decreased to roughly 31 on labs today.  Patient does report that pain has improved this morning and rates it as a 4-5 out of 10      Past Medical History:  Past Medical History:   Diagnosis Date    Arthritis     Chronic pain disorder     Depression  "    RECENT DEATH OF DAUGHTER    Extremity pain     Grief     Sudden loss of daughter in law 4 weeks ago    Headache     History of gastritis     History of gout     History of kidney stones     Hyperlipidemia     Hypertension     Low back pain     Lumbosacral disc disease     Migraines     Rheumatic fever     Wears glasses        Current Meds:  Current Outpatient Medications   Medication Sig Dispense Refill    amLODIPine (NORVASC) 10 MG tablet TAKE 1 TABLET BY MOUTH EVERY DAY  3    apixaban (ELIQUIS) 5 MG tablet tablet Take 1 tablet by mouth 2 (Two) Times a Day. Indications: Atrial Fibrillation 60 tablet 3    indomethacin (INDOCIN) 50 MG capsule Take 1 capsule by mouth 3 (Three) Times a Day As Needed for Mild Pain  or Moderate Pain . 30 capsule 0    metoprolol tartrate (LOPRESSOR) 25 MG tablet Take 1 tablet by mouth 2 (Two) Times a Day. 180 tablet 1    oxyCODONE-acetaminophen (PERCOCET) 7.5-325 MG per tablet Take 1 tablet by mouth 2 (Two) Times a Day.      pregabalin (LYRICA) 150 MG capsule 1 capsule. Please see attached for detailed directions      rosuvastatin (CRESTOR) 20 MG tablet Take 1 tablet by mouth Daily. 30 tablet 3    traZODone (DESYREL) 50 MG tablet TAKE 1 TO 3 TABLETS AT BEDTIME.  2     No current facility-administered medications for this visit.        Allergies:   No Known Allergies     Past Surgical History:  Past Surgical History:   Procedure Laterality Date    CARDIAC CATHETERIZATION N/A 07/28/2023    Procedure: Left Heart Cath (Eliquis instructions per Dr. Fournier);  Surgeon: Roro Blackmon MD;  Location: [facility] CATH INVASIVE LOCATION;  Service: Cardiology;  Laterality: N/A;    CHOLECYSTECTOMY  2001    COLONOSCOPY      CYSTOSCOPY W/ LITHOLAPAXY / EHL  2004    KIDNEY STONE SURGERY      LUMBAR FUSION  10/2013    posterior lumbar interbody fusion, L4-L5, Dr. Raad Rivera    SPINAL CORD STIMULATOR IMPLANT  07/18/2016    SPINAL CORD STIMULATOR IMPLANT N/A 09/26/2016    Procedure: SPINAL CORD STIMULATOR  INSERTION PHASE 1;  Surgeon: Dany Ratliff MD;  Location: [facility];  Service:        Social History:  Social History     Socioeconomic History    Marital status:    Tobacco Use    Smoking status: Former     Years: 20     Types: Cigarettes     Quit date: 1996     Years since quittin.5    Smokeless tobacco: Never   Vaping Use    Vaping Use: Never used   Substance and Sexual Activity    Alcohol use: No     Comment: Rarely    Drug use: No    Sexual activity: Defer       Family History:  Family History   Problem Relation Age of Onset    Heart disease Mother     Pulmonary fibrosis Father        Review of Systems:  Review of Systems   Constitutional:  Positive for chills. Negative for fatigue, fever and unexpected weight change.   Respiratory:  Negative for cough, chest tightness, shortness of breath and wheezing.    Cardiovascular:  Negative for chest pain and leg swelling.   Gastrointestinal:  Positive for abdominal pain and nausea. Negative for constipation, diarrhea and vomiting.   Genitourinary:  Positive for flank pain. Negative for difficulty urinating, dysuria, frequency, penile swelling, scrotal swelling, testicular pain and urgency.   Musculoskeletal:  Positive for back pain. Negative for joint swelling.   Skin:  Negative for rash.   Neurological:  Negative for dizziness and headaches.   Psychiatric/Behavioral:  Negative for confusion and suicidal ideas.        Physical Exam:  Physical Exam  Constitutional:       General: He is not in acute distress.     Appearance: Normal appearance.   HENT:      Head: Normocephalic and atraumatic.      Nose: Nose normal.      Mouth/Throat:      Mouth: Mucous membranes are moist.   Eyes:      Conjunctiva/sclera: Conjunctivae normal.   Cardiovascular:      Rate and Rhythm: Normal rate and regular rhythm.      Pulses: Normal pulses.      Heart sounds: Normal heart sounds.   Pulmonary:      Effort: Pulmonary effort is normal.      Breath sounds: Normal breath  sounds.   Abdominal:      General: Bowel sounds are normal.      Palpations: Abdomen is soft.   Genitourinary:     Penis: Normal.       Testes: Normal.   Musculoskeletal:         General: Normal range of motion.      Cervical back: Normal range of motion.   Skin:     General: Skin is warm.   Neurological:      General: No focal deficit present.      Mental Status: He is alert and oriented to person, place, and time.   Psychiatric:         Mood and Affect: Mood normal.         Behavior: Behavior normal.         Thought Content: Thought content normal.         Judgment: Judgment normal.                  Recent Image (CT and/or KUB):   CT Abdomen and Pelvis: No results found for this or any previous visit.     CT Stone Protocol: Results for orders placed during the hospital encounter of 02/15/24    CT Abdomen Pelvis Stone Protocol    Narrative  EXAM: CT ABDOMEN PELVIS STONE PROTOCOL-      TECHNIQUE: Multiple axial CT images were obtained from lung bases  through pubic symphysis WITHOUT administration of IV contrast.  Reformatted images in the coronal and/or sagittal plane(s) were  generated from the axial data set to facilitate diagnostic accuracy  and/or surgical planning.  Oral Contrast:NONE.    Radiation dose reduction techniques were utilized per ALARA protocol.  Automated exposure control was initiated through either or [employer] or  DoseRight software packages by  protocol.  DOSE:    Clinical information Flank pain, kidney stone suspected    Comparison none immediately available at this time    FINDINGS:    Lower thorax: Clear. No effusions.    Abdomen:    Liver: Homogeneous. No focal hepatic mass or ductal dilatation.    Gallbladder: Surgically absent    Pancreas: Unremarkable. No mass or ductal dilatation.    Spleen: Homogeneous. No splenomegaly.    Adrenals: No mass.    Kidneys/ureters: Nonobstructing stones are present in both kidneys.  There is right-sided hydronephrosis and hydroureter which  appears to be  due to 2 right mid ureteral stones. The largest measures 7 mm    GI tract: Non-dilated. No definite wall thickening.. There is no  evidence of appendicitis    MESENTERY: No free fluid, walled off fluid collections, mesenteric  stranding, or enlarged lymph nodes      Vasculature: Evidence of atherosclerotic vascular disease    Abdominal wall: No focal hernia or mass.      Bladder: No focal mass or significant wall thickening    Reproductive: Unremarkable as visualized    Bones: Postoperative hardware at L4-5    Impression  1. Obstructive uropathy on the right due to 2 right mid ureteral stones    2. Other findings as above                  This report was finalized on 2/15/2024 10:07 AM by Dr. Oliverio Mccray MD.     KUB: No results found for this or any previous visit.       Labs:  Brief Urine Lab Results  (Last result in the past 365 days)        Color   Clarity   Blood   Leuk Est   Nitrite   Protein   CREAT   Urine HCG        02/15/24 1019 Yellow   Clear   Large (3+)   Negative   Negative   Negative                 Admission on 02/15/2024, Discharged on 02/15/2024   Component Date Value Ref Range Status    Glucose 02/15/2024 122 (H)  65 - 99 mg/dL Final    BUN 02/15/2024 30 (H)  8 - 23 mg/dL Final    Creatinine 02/15/2024 2.18 (H)  0.76 - 1.27 mg/dL Final    Sodium 02/15/2024 139  136 - 145 mmol/L Final    Potassium 02/15/2024 5.0  3.5 - 5.2 mmol/L Final    Chloride 02/15/2024 101  98 - 107 mmol/L Final    CO2 02/15/2024 22.7  22.0 - 29.0 mmol/L Final    Calcium 02/15/2024 10.1  8.6 - 10.5 mg/dL Final    Total Protein 02/15/2024 7.6  6.0 - 8.5 g/dL Final    Albumin 02/15/2024 4.4  3.5 - 5.2 g/dL Final    ALT (SGPT) 02/15/2024 15  1 - 41 U/L Final    AST (SGOT) 02/15/2024 20  1 - 40 U/L Final    Alkaline Phosphatase 02/15/2024 81  39 - 117 U/L Final    Total Bilirubin 02/15/2024 0.4  0.0 - 1.2 mg/dL Final    Globulin 02/15/2024 3.2  gm/dL Final    A/G Ratio 02/15/2024 1.4  g/dL Final    BUN/Creatinine  Ratio 02/15/2024 13.8  7.0 - 25.0 Final    Anion Gap 02/15/2024 15.3 (H)  5.0 - 15.0 mmol/L Final    eGFR 02/15/2024 31.2 (L)  >60.0 mL/min/1.73 Final    Lipase 02/15/2024 12 (L)  13 - 60 U/L Final    Color, UA 02/15/2024 Yellow  Yellow, Straw Final    Appearance, UA 02/15/2024 Clear  Clear Final    pH, UA 02/15/2024 5.5  5.0 - 8.0 Final    Specific Gravity, UA 02/15/2024 1.018  1.005 - 1.030 Final    Glucose, UA 02/15/2024 Negative  Negative Final    Ketones, UA 02/15/2024 15 mg/dL (1+) (A)  Negative Final    Bilirubin, UA 02/15/2024 Negative  Negative Final    Blood, UA 02/15/2024 Large (3+) (A)  Negative Final    Protein, UA 02/15/2024 Negative  Negative Final    Leuk Esterase, UA 02/15/2024 Negative  Negative Final    Nitrite, UA 02/15/2024 Negative  Negative Final    Urobilinogen, UA 02/15/2024 0.2 E.U./dL  0.2 - 1.0 E.U./dL Final    C-Reactive Protein 02/15/2024 2.18 (H)  0.00 - 0.50 mg/dL Final    Sed Rate 02/15/2024 42 (H)  0 - 20 mm/hr Final    WBC 02/15/2024 10.00  3.40 - 10.80 10*3/mm3 Final    RBC 02/15/2024 4.72  4.14 - 5.80 10*6/mm3 Final    Hemoglobin 02/15/2024 14.2  13.0 - 17.7 g/dL Final    Hematocrit 02/15/2024 44.2  37.5 - 51.0 % Final    MCV 02/15/2024 93.6  79.0 - 97.0 fL Final    MCH 02/15/2024 30.1  26.6 - 33.0 pg Final    MCHC 02/15/2024 32.1  31.5 - 35.7 g/dL Final    RDW 02/15/2024 11.9 (L)  12.3 - 15.4 % Final    RDW-SD 02/15/2024 41.1  37.0 - 54.0 fl Final    MPV 02/15/2024 10.0  6.0 - 12.0 fL Final    Platelets 02/15/2024 219  140 - 450 10*3/mm3 Final    Neutrophil % 02/15/2024 75.2  42.7 - 76.0 % Final    Lymphocyte % 02/15/2024 15.6 (L)  19.6 - 45.3 % Final    Monocyte % 02/15/2024 7.7  5.0 - 12.0 % Final    Eosinophil % 02/15/2024 0.7  0.3 - 6.2 % Final    Basophil % 02/15/2024 0.4  0.0 - 1.5 % Final    Immature Grans % 02/15/2024 0.4  0.0 - 0.5 % Final    Neutrophils, Absolute 02/15/2024 7.52 (H)  1.70 - 7.00 10*3/mm3 Final    Lymphocytes, Absolute 02/15/2024 1.56  0.70 - 3.10  10*3/mm3 Final    Monocytes, Absolute 02/15/2024 0.77  0.10 - 0.90 10*3/mm3 Final    Eosinophils, Absolute 02/15/2024 0.07  0.00 - 0.40 10*3/mm3 Final    Basophils, Absolute 02/15/2024 0.04  0.00 - 0.20 10*3/mm3 Final    Immature Grans, Absolute 02/15/2024 0.04  0.00 - 0.05 10*3/mm3 Final    nRBC 02/15/2024 0.0  0.0 - 0.2 /100 WBC Final    Extra Tube 02/15/2024 Hold for add-ons.   Final    Auto resulted.    Extra Tube 02/15/2024 hold for add-on   Final    Auto resulted    Extra Tube 02/15/2024 Hold for add-ons.   Final    Auto resulted.    Extra Tube 02/15/2024 Hold for add-ons.   Final    Auto resulted    RBC, UA 02/15/2024 Too Numerous to Count (A)  None Seen, 0-2 /HPF Final    WBC, UA 02/15/2024 0-2  None Seen, 0-2 /HPF Final    Urine culture not indicated.    Bacteria, UA 02/15/2024 None Seen  None Seen /HPF Final    Squamous Epithelial Cells, UA 02/15/2024 None Seen  None Seen, 0-2 /HPF Final    Hyaline Casts, UA 02/15/2024 None Seen  None Seen /LPF Final    Methodology 02/15/2024 Automated Microscopy   Final        Procedure: None  Procedures     I have reviewed and agree with the above PMH, PSH, FMH, social history, medications, allergies, and labs.     Assessment/Plan:   Problem List Items Addressed This Visit          Genitourinary and Reproductive     Right ureteral stone - Primary    Relevant Orders    Case Request (Completed)    Bilateral kidney stones       Health Maintenance:   Health Maintenance Due   Topic Date Due    URINE MICROALBUMIN  Never done    COLORECTAL CANCER SCREENING  Never done    Pneumococcal Vaccine 65+ (1 of 2 - PCV) Never done    TDAP/TD VACCINES (1 - Tdap) Never done    ZOSTER VACCINE (1 of 2) Never done    RSV Vaccine - Adults (1 - 1-dose 60+ series) Never done    HEPATITIS C SCREENING  Never done    DIABETIC FOOT EXAM  Never done    DIABETIC EYE EXAM  Never done    BMI FOLLOWUP  06/08/2018    INFLUENZA VACCINE  08/01/2023    COVID-19 Vaccine (3 - 2023-24 season) 09/01/2023         Smoking Counseling: Never used smokeless tobacco.  Former user smoking tobacco.    Urine Incontinence: Patient reports that he is not currently experiencing any symptoms of urinary incontinence.    Patient was given instructions and counseling regarding his condition or for health maintenance advice. Please see specific information pulled into the AVS if appropriate.    Patient Education:   Nephrolithiasis- It was discussed with the patient the presence of a 2 obstructing proximal to mid right ureteral calculus as well as large nonobstructing intrarenal calculi bilaterally.  We discussed the various therapeutic options available including percutaneous nephrostolithotomy, ureteroscopy and extracorporeal shockwave  lithotripsy.  We discussed the risks of lithotripsy including the passage of stones leading to a 3% chance of Steinstrasse or a large string of stones in the distal ureter. In this incidence the patient was informed that a ureteroscopy is indicated for obstructing fragments.  Patient was informed of an extremely rare incidence of renal hematoma and the significance of this.  Patient was educated on percutaneous nephrostolithotomy and its use as well as the risks and benefits such as the need for postoperative hospitalization, and the risk of damage to the kidney and the remote risk of a nephrectomy.  We also discussed the use of ureteroscopy in the upper tracts and its decreased success rate to completely remove the stones likely causing stent placement leading to an additional procedure for removal.  We discussed the absolute relative indicators for intervention including the presence of sepsis and uncontrollable pain leading to need for urgent intervention.  We discussed placement of a stent if indicated and the management of the stent as well.  Given patient's acute kidney injury and recent symptomology I am recommending an urgent ureteroscopy with holmium laser lithotripsy and possible stent  placement.  I discussed this procedure in detail including risk and benefits.  I did advise patient to go ahead and discontinue his Eliquis twice daily today.  Advised patient that if he does have worsening overall symptoms including but not limited to fever, chills, nausea, vomiting, gross hematuria, altered mental status, increasing back pain uncontrolled with medication, or inability urinate proceed immediately to the ER for evaluation.  Vies him to continue Flomax once daily and increase water intake to 2 to 3 L/day.  Otherwise I will repeat blood work tomorrow morning for his preop labs with a CBC and a CMP and call patient with results once available.  Will schedule him for a right ureteroscopy with possible stent placement this Monday.  Patient verbalized understanding.    Visit Diagnoses:    ICD-10-CM ICD-9-CM   1. Right ureteral stone  N20.1 592.1   2. Bilateral kidney stones  N20.0 592.0       Meds Ordered During Visit:  No orders of the defined types were placed in this encounter.      Follow Up Appointment: Right uteroscopy with possible stent placements this Monday  No follow-ups on file.      This document has been electronically signed by Shai Lawson PA-C   February 15, 2024 11:56 EST    Part of this note may be an electronic transcription/translation of spoken language to printed text using the Dragon Dictation System.

## 2024-02-15 NOTE — H&P (VIEW-ONLY)
"Chief Complaint:    Chief Complaint   Patient presents with    Nephrolithiasis       Vital Signs:   /76 (BP Location: Left arm, Patient Position: Sitting)   Pulse (!) 42   Ht 182.9 cm (72.01\")   Wt 91.6 kg (202 lb)   BMI 27.39 kg/m²   Body mass index is 27.39 kg/m².      HPI:  Williams Lora is a 73 y.o. male who presents today for initial evaluation     History of Present Illness  Mr. Paredes presents to the clinic for evaluation of nephrolithiasis.  Patient has a past medical history significant for chronic back pain, gout, several kidney stones requiring surgical intervention, hyperlipidemia, hypertension, and A-fib for which she is on chronic Eliquis.  Patient states that he began to have right-sided back and flank pain that was uncontrolled with his current as needed Percocet.  He went to the ER for evaluation and had a CT scan of the abdomen pelvis completed at that time that showed 2 proximal to mid right ureteral stones with the largest stone distally and measuring roughly 7 mm in size.  He had multiple nonobstructing intrarenal calculi bilaterally with largest stone measuring roughly 1 cm in size in the right midpole of the kidney.  Largest on the left was roughly 7 mm in size.  Patient states that he is underwent several surgeries in the past with his last one occurring in [facility] roughly 4 to 5 years ago.  He is scheduled to take Flomax daily but states he takes this irregularly.  He had a CBC completed at time of ER visit that showed normal white blood cell and H&H.  He had a CMP completed that showed an acute kidney injury.  His creatinine roughly 6 months ago was roughly 1.3 and had increased to 2.18.  GFR at that time was 59 and decreased to roughly 31 on labs today.  Patient does report that pain has improved this morning and rates it as a 4-5 out of 10      Past Medical History:  Past Medical History:   Diagnosis Date    Arthritis     Chronic pain disorder     Depression  "    RECENT DEATH OF DAUGHTER    Extremity pain     Grief     Sudden loss of daughter in law 4 weeks ago    Headache     History of gastritis     History of gout     History of kidney stones     Hyperlipidemia     Hypertension     Low back pain     Lumbosacral disc disease     Migraines     Rheumatic fever     Wears glasses        Current Meds:  Current Outpatient Medications   Medication Sig Dispense Refill    amLODIPine (NORVASC) 10 MG tablet TAKE 1 TABLET BY MOUTH EVERY DAY  3    apixaban (ELIQUIS) 5 MG tablet tablet Take 1 tablet by mouth 2 (Two) Times a Day. Indications: Atrial Fibrillation 60 tablet 3    indomethacin (INDOCIN) 50 MG capsule Take 1 capsule by mouth 3 (Three) Times a Day As Needed for Mild Pain  or Moderate Pain . 30 capsule 0    metoprolol tartrate (LOPRESSOR) 25 MG tablet Take 1 tablet by mouth 2 (Two) Times a Day. 180 tablet 1    oxyCODONE-acetaminophen (PERCOCET) 7.5-325 MG per tablet Take 1 tablet by mouth 2 (Two) Times a Day.      pregabalin (LYRICA) 150 MG capsule 1 capsule. Please see attached for detailed directions      rosuvastatin (CRESTOR) 20 MG tablet Take 1 tablet by mouth Daily. 30 tablet 3    traZODone (DESYREL) 50 MG tablet TAKE 1 TO 3 TABLETS AT BEDTIME.  2     No current facility-administered medications for this visit.        Allergies:   No Known Allergies     Past Surgical History:  Past Surgical History:   Procedure Laterality Date    CARDIAC CATHETERIZATION N/A 07/28/2023    Procedure: Left Heart Cath (Eliquis instructions per Dr. Fournier);  Surgeon: Roro Blackmon MD;  Location: [facility] CATH INVASIVE LOCATION;  Service: Cardiology;  Laterality: N/A;    CHOLECYSTECTOMY  2001    COLONOSCOPY      CYSTOSCOPY W/ LITHOLAPAXY / EHL  2004    KIDNEY STONE SURGERY      LUMBAR FUSION  10/2013    posterior lumbar interbody fusion, L4-L5, Dr. Raad Rivera    SPINAL CORD STIMULATOR IMPLANT  07/18/2016    SPINAL CORD STIMULATOR IMPLANT N/A 09/26/2016    Procedure: SPINAL CORD STIMULATOR  INSERTION PHASE 1;  Surgeon: Dany Ratliff MD;  Location: [facility];  Service:        Social History:  Social History     Socioeconomic History    Marital status:    Tobacco Use    Smoking status: Former     Years: 20     Types: Cigarettes     Quit date: 1996     Years since quittin.5    Smokeless tobacco: Never   Vaping Use    Vaping Use: Never used   Substance and Sexual Activity    Alcohol use: No     Comment: Rarely    Drug use: No    Sexual activity: Defer       Family History:  Family History   Problem Relation Age of Onset    Heart disease Mother     Pulmonary fibrosis Father        Review of Systems:  Review of Systems   Constitutional:  Positive for chills. Negative for fatigue, fever and unexpected weight change.   Respiratory:  Negative for cough, chest tightness, shortness of breath and wheezing.    Cardiovascular:  Negative for chest pain and leg swelling.   Gastrointestinal:  Positive for abdominal pain and nausea. Negative for constipation, diarrhea and vomiting.   Genitourinary:  Positive for flank pain. Negative for difficulty urinating, dysuria, frequency, penile swelling, scrotal swelling, testicular pain and urgency.   Musculoskeletal:  Positive for back pain. Negative for joint swelling.   Skin:  Negative for rash.   Neurological:  Negative for dizziness and headaches.   Psychiatric/Behavioral:  Negative for confusion and suicidal ideas.        Physical Exam:  Physical Exam  Constitutional:       General: He is not in acute distress.     Appearance: Normal appearance.   HENT:      Head: Normocephalic and atraumatic.      Nose: Nose normal.      Mouth/Throat:      Mouth: Mucous membranes are moist.   Eyes:      Conjunctiva/sclera: Conjunctivae normal.   Cardiovascular:      Rate and Rhythm: Normal rate and regular rhythm.      Pulses: Normal pulses.      Heart sounds: Normal heart sounds.   Pulmonary:      Effort: Pulmonary effort is normal.      Breath sounds: Normal breath  sounds.   Abdominal:      General: Bowel sounds are normal.      Palpations: Abdomen is soft.   Genitourinary:     Penis: Normal.       Testes: Normal.   Musculoskeletal:         General: Normal range of motion.      Cervical back: Normal range of motion.   Skin:     General: Skin is warm.   Neurological:      General: No focal deficit present.      Mental Status: He is alert and oriented to person, place, and time.   Psychiatric:         Mood and Affect: Mood normal.         Behavior: Behavior normal.         Thought Content: Thought content normal.         Judgment: Judgment normal.                  Recent Image (CT and/or KUB):   CT Abdomen and Pelvis: No results found for this or any previous visit.     CT Stone Protocol: Results for orders placed during the hospital encounter of 02/15/24    CT Abdomen Pelvis Stone Protocol    Narrative  EXAM: CT ABDOMEN PELVIS STONE PROTOCOL-      TECHNIQUE: Multiple axial CT images were obtained from lung bases  through pubic symphysis WITHOUT administration of IV contrast.  Reformatted images in the coronal and/or sagittal plane(s) were  generated from the axial data set to facilitate diagnostic accuracy  and/or surgical planning.  Oral Contrast:NONE.    Radiation dose reduction techniques were utilized per ALARA protocol.  Automated exposure control was initiated through either or [employer] or  DoseRight software packages by  protocol.  DOSE:    Clinical information Flank pain, kidney stone suspected    Comparison none immediately available at this time    FINDINGS:    Lower thorax: Clear. No effusions.    Abdomen:    Liver: Homogeneous. No focal hepatic mass or ductal dilatation.    Gallbladder: Surgically absent    Pancreas: Unremarkable. No mass or ductal dilatation.    Spleen: Homogeneous. No splenomegaly.    Adrenals: No mass.    Kidneys/ureters: Nonobstructing stones are present in both kidneys.  There is right-sided hydronephrosis and hydroureter which  appears to be  due to 2 right mid ureteral stones. The largest measures 7 mm    GI tract: Non-dilated. No definite wall thickening.. There is no  evidence of appendicitis    MESENTERY: No free fluid, walled off fluid collections, mesenteric  stranding, or enlarged lymph nodes      Vasculature: Evidence of atherosclerotic vascular disease    Abdominal wall: No focal hernia or mass.      Bladder: No focal mass or significant wall thickening    Reproductive: Unremarkable as visualized    Bones: Postoperative hardware at L4-5    Impression  1. Obstructive uropathy on the right due to 2 right mid ureteral stones    2. Other findings as above                  This report was finalized on 2/15/2024 10:07 AM by Dr. Oliverio Mccray MD.     KUB: No results found for this or any previous visit.       Labs:  Brief Urine Lab Results  (Last result in the past 365 days)        Color   Clarity   Blood   Leuk Est   Nitrite   Protein   CREAT   Urine HCG        02/15/24 1019 Yellow   Clear   Large (3+)   Negative   Negative   Negative                 Admission on 02/15/2024, Discharged on 02/15/2024   Component Date Value Ref Range Status    Glucose 02/15/2024 122 (H)  65 - 99 mg/dL Final    BUN 02/15/2024 30 (H)  8 - 23 mg/dL Final    Creatinine 02/15/2024 2.18 (H)  0.76 - 1.27 mg/dL Final    Sodium 02/15/2024 139  136 - 145 mmol/L Final    Potassium 02/15/2024 5.0  3.5 - 5.2 mmol/L Final    Chloride 02/15/2024 101  98 - 107 mmol/L Final    CO2 02/15/2024 22.7  22.0 - 29.0 mmol/L Final    Calcium 02/15/2024 10.1  8.6 - 10.5 mg/dL Final    Total Protein 02/15/2024 7.6  6.0 - 8.5 g/dL Final    Albumin 02/15/2024 4.4  3.5 - 5.2 g/dL Final    ALT (SGPT) 02/15/2024 15  1 - 41 U/L Final    AST (SGOT) 02/15/2024 20  1 - 40 U/L Final    Alkaline Phosphatase 02/15/2024 81  39 - 117 U/L Final    Total Bilirubin 02/15/2024 0.4  0.0 - 1.2 mg/dL Final    Globulin 02/15/2024 3.2  gm/dL Final    A/G Ratio 02/15/2024 1.4  g/dL Final    BUN/Creatinine  Ratio 02/15/2024 13.8  7.0 - 25.0 Final    Anion Gap 02/15/2024 15.3 (H)  5.0 - 15.0 mmol/L Final    eGFR 02/15/2024 31.2 (L)  >60.0 mL/min/1.73 Final    Lipase 02/15/2024 12 (L)  13 - 60 U/L Final    Color, UA 02/15/2024 Yellow  Yellow, Straw Final    Appearance, UA 02/15/2024 Clear  Clear Final    pH, UA 02/15/2024 5.5  5.0 - 8.0 Final    Specific Gravity, UA 02/15/2024 1.018  1.005 - 1.030 Final    Glucose, UA 02/15/2024 Negative  Negative Final    Ketones, UA 02/15/2024 15 mg/dL (1+) (A)  Negative Final    Bilirubin, UA 02/15/2024 Negative  Negative Final    Blood, UA 02/15/2024 Large (3+) (A)  Negative Final    Protein, UA 02/15/2024 Negative  Negative Final    Leuk Esterase, UA 02/15/2024 Negative  Negative Final    Nitrite, UA 02/15/2024 Negative  Negative Final    Urobilinogen, UA 02/15/2024 0.2 E.U./dL  0.2 - 1.0 E.U./dL Final    C-Reactive Protein 02/15/2024 2.18 (H)  0.00 - 0.50 mg/dL Final    Sed Rate 02/15/2024 42 (H)  0 - 20 mm/hr Final    WBC 02/15/2024 10.00  3.40 - 10.80 10*3/mm3 Final    RBC 02/15/2024 4.72  4.14 - 5.80 10*6/mm3 Final    Hemoglobin 02/15/2024 14.2  13.0 - 17.7 g/dL Final    Hematocrit 02/15/2024 44.2  37.5 - 51.0 % Final    MCV 02/15/2024 93.6  79.0 - 97.0 fL Final    MCH 02/15/2024 30.1  26.6 - 33.0 pg Final    MCHC 02/15/2024 32.1  31.5 - 35.7 g/dL Final    RDW 02/15/2024 11.9 (L)  12.3 - 15.4 % Final    RDW-SD 02/15/2024 41.1  37.0 - 54.0 fl Final    MPV 02/15/2024 10.0  6.0 - 12.0 fL Final    Platelets 02/15/2024 219  140 - 450 10*3/mm3 Final    Neutrophil % 02/15/2024 75.2  42.7 - 76.0 % Final    Lymphocyte % 02/15/2024 15.6 (L)  19.6 - 45.3 % Final    Monocyte % 02/15/2024 7.7  5.0 - 12.0 % Final    Eosinophil % 02/15/2024 0.7  0.3 - 6.2 % Final    Basophil % 02/15/2024 0.4  0.0 - 1.5 % Final    Immature Grans % 02/15/2024 0.4  0.0 - 0.5 % Final    Neutrophils, Absolute 02/15/2024 7.52 (H)  1.70 - 7.00 10*3/mm3 Final    Lymphocytes, Absolute 02/15/2024 1.56  0.70 - 3.10  10*3/mm3 Final    Monocytes, Absolute 02/15/2024 0.77  0.10 - 0.90 10*3/mm3 Final    Eosinophils, Absolute 02/15/2024 0.07  0.00 - 0.40 10*3/mm3 Final    Basophils, Absolute 02/15/2024 0.04  0.00 - 0.20 10*3/mm3 Final    Immature Grans, Absolute 02/15/2024 0.04  0.00 - 0.05 10*3/mm3 Final    nRBC 02/15/2024 0.0  0.0 - 0.2 /100 WBC Final    Extra Tube 02/15/2024 Hold for add-ons.   Final    Auto resulted.    Extra Tube 02/15/2024 hold for add-on   Final    Auto resulted    Extra Tube 02/15/2024 Hold for add-ons.   Final    Auto resulted.    Extra Tube 02/15/2024 Hold for add-ons.   Final    Auto resulted    RBC, UA 02/15/2024 Too Numerous to Count (A)  None Seen, 0-2 /HPF Final    WBC, UA 02/15/2024 0-2  None Seen, 0-2 /HPF Final    Urine culture not indicated.    Bacteria, UA 02/15/2024 None Seen  None Seen /HPF Final    Squamous Epithelial Cells, UA 02/15/2024 None Seen  None Seen, 0-2 /HPF Final    Hyaline Casts, UA 02/15/2024 None Seen  None Seen /LPF Final    Methodology 02/15/2024 Automated Microscopy   Final        Procedure: None  Procedures     I have reviewed and agree with the above PMH, PSH, FMH, social history, medications, allergies, and labs.     Assessment/Plan:   Problem List Items Addressed This Visit          Genitourinary and Reproductive     Right ureteral stone - Primary    Relevant Orders    Case Request (Completed)    Bilateral kidney stones       Health Maintenance:   Health Maintenance Due   Topic Date Due    URINE MICROALBUMIN  Never done    COLORECTAL CANCER SCREENING  Never done    Pneumococcal Vaccine 65+ (1 of 2 - PCV) Never done    TDAP/TD VACCINES (1 - Tdap) Never done    ZOSTER VACCINE (1 of 2) Never done    RSV Vaccine - Adults (1 - 1-dose 60+ series) Never done    HEPATITIS C SCREENING  Never done    DIABETIC FOOT EXAM  Never done    DIABETIC EYE EXAM  Never done    BMI FOLLOWUP  06/08/2018    INFLUENZA VACCINE  08/01/2023    COVID-19 Vaccine (3 - 2023-24 season) 09/01/2023         Smoking Counseling: Never used smokeless tobacco.  Former user smoking tobacco.    Urine Incontinence: Patient reports that he is not currently experiencing any symptoms of urinary incontinence.    Patient was given instructions and counseling regarding his condition or for health maintenance advice. Please see specific information pulled into the AVS if appropriate.    Patient Education:   Nephrolithiasis- It was discussed with the patient the presence of a 2 obstructing proximal to mid right ureteral calculus as well as large nonobstructing intrarenal calculi bilaterally.  We discussed the various therapeutic options available including percutaneous nephrostolithotomy, ureteroscopy and extracorporeal shockwave  lithotripsy.  We discussed the risks of lithotripsy including the passage of stones leading to a 3% chance of Steinstrasse or a large string of stones in the distal ureter. In this incidence the patient was informed that a ureteroscopy is indicated for obstructing fragments.  Patient was informed of an extremely rare incidence of renal hematoma and the significance of this.  Patient was educated on percutaneous nephrostolithotomy and its use as well as the risks and benefits such as the need for postoperative hospitalization, and the risk of damage to the kidney and the remote risk of a nephrectomy.  We also discussed the use of ureteroscopy in the upper tracts and its decreased success rate to completely remove the stones likely causing stent placement leading to an additional procedure for removal.  We discussed the absolute relative indicators for intervention including the presence of sepsis and uncontrollable pain leading to need for urgent intervention.  We discussed placement of a stent if indicated and the management of the stent as well.  Given patient's acute kidney injury and recent symptomology I am recommending an urgent ureteroscopy with holmium laser lithotripsy and possible stent  placement.  I discussed this procedure in detail including risk and benefits.  I did advise patient to go ahead and discontinue his Eliquis twice daily today.  Advised patient that if he does have worsening overall symptoms including but not limited to fever, chills, nausea, vomiting, gross hematuria, altered mental status, increasing back pain uncontrolled with medication, or inability urinate proceed immediately to the ER for evaluation.  Vies him to continue Flomax once daily and increase water intake to 2 to 3 L/day.  Otherwise I will repeat blood work tomorrow morning for his preop labs with a CBC and a CMP and call patient with results once available.  Will schedule him for a right ureteroscopy with possible stent placement this Monday.  Patient verbalized understanding.    Visit Diagnoses:    ICD-10-CM ICD-9-CM   1. Right ureteral stone  N20.1 592.1   2. Bilateral kidney stones  N20.0 592.0       Meds Ordered During Visit:  No orders of the defined types were placed in this encounter.      Follow Up Appointment: Right uteroscopy with possible stent placements this Monday  No follow-ups on file.      This document has been electronically signed by Shai Lawson PA-C   February 15, 2024 11:56 EST    Part of this note may be an electronic transcription/translation of spoken language to printed text using the Dragon Dictation System.

## 2024-02-16 ENCOUNTER — PRE-ADMISSION TESTING (OUTPATIENT)
Dept: PREADMISSION TESTING | Facility: HOSPITAL | Age: 74
End: 2024-02-16
Payer: MEDICARE

## 2024-02-16 NOTE — DISCHARGE INSTRUCTIONS

## 2024-02-19 ENCOUNTER — HOSPITAL ENCOUNTER (OUTPATIENT)
Facility: HOSPITAL | Age: 74
Setting detail: HOSPITAL OUTPATIENT SURGERY
Discharge: HOME | End: 2024-02-19
Attending: UROLOGY | Admitting: UROLOGY
Payer: MEDICARE

## 2024-02-19 ENCOUNTER — APPOINTMENT (OUTPATIENT)
Dept: GENERAL RADIOLOGY | Facility: HOSPITAL | Age: 74
End: 2024-02-19
Payer: MEDICARE

## 2024-02-19 ENCOUNTER — ANESTHESIA EVENT (OUTPATIENT)
Dept: PERIOP | Facility: HOSPITAL | Age: 74
End: 2024-02-19
Payer: MEDICARE

## 2024-02-19 ENCOUNTER — ANESTHESIA (OUTPATIENT)
Dept: PERIOP | Facility: HOSPITAL | Age: 74
End: 2024-02-19
Payer: MEDICARE

## 2024-02-19 VITALS
WEIGHT: 200.4 LBS | TEMPERATURE: 97.8 F | DIASTOLIC BLOOD PRESSURE: 78 MMHG | HEIGHT: 72 IN | OXYGEN SATURATION: 98 % | RESPIRATION RATE: 16 BRPM | HEART RATE: 70 BPM | BODY MASS INDEX: 27.14 KG/M2 | SYSTOLIC BLOOD PRESSURE: 122 MMHG

## 2024-02-19 DIAGNOSIS — N20.1 RIGHT URETERAL STONE: ICD-10-CM

## 2024-02-19 PROCEDURE — 76000 FLUOROSCOPY <1 HR PHYS/QHP: CPT

## 2024-02-19 PROCEDURE — C2617 STENT, NON-COR, TEM W/O DEL: HCPCS | Performed by: UROLOGY

## 2024-02-19 PROCEDURE — 25010000002 FENTANYL CITRATE (PF) 50 MCG/ML SOLUTION: Performed by: NURSE ANESTHETIST, CERTIFIED REGISTERED

## 2024-02-19 PROCEDURE — 25010000002 ONDANSETRON PER 1 MG: Performed by: NURSE ANESTHETIST, CERTIFIED REGISTERED

## 2024-02-19 PROCEDURE — C1769 GUIDE WIRE: HCPCS | Performed by: UROLOGY

## 2024-02-19 PROCEDURE — 25010000002 GENTAMICIN PER 80 MG

## 2024-02-19 PROCEDURE — 25810000003 LACTATED RINGERS PER 1000 ML: Performed by: ANESTHESIOLOGY

## 2024-02-19 PROCEDURE — 25010000002 PROPOFOL 200 MG/20ML EMULSION: Performed by: NURSE ANESTHETIST, CERTIFIED REGISTERED

## 2024-02-19 PROCEDURE — 25510000001 IOPAMIDOL 61 % SOLUTION

## 2024-02-19 PROCEDURE — C1894 INTRO/SHEATH, NON-LASER: HCPCS | Performed by: UROLOGY

## 2024-02-19 DEVICE — IMPLANTABLE DEVICE: Type: IMPLANTABLE DEVICE | Site: URETER | Status: FUNCTIONAL

## 2024-02-19 RX ORDER — ONDANSETRON 2 MG/ML
INJECTION INTRAMUSCULAR; INTRAVENOUS AS NEEDED
Status: DISCONTINUED | OUTPATIENT
Start: 2024-02-19 | End: 2024-02-19 | Stop reason: SURG

## 2024-02-19 RX ORDER — SODIUM CHLORIDE 0.9 % (FLUSH) 0.9 %
10 SYRINGE (ML) INJECTION EVERY 12 HOURS SCHEDULED
Status: DISCONTINUED | OUTPATIENT
Start: 2024-02-19 | End: 2024-02-19 | Stop reason: HOSPADM

## 2024-02-19 RX ORDER — FAMOTIDINE 10 MG/ML
INJECTION, SOLUTION INTRAVENOUS AS NEEDED
Status: DISCONTINUED | OUTPATIENT
Start: 2024-02-19 | End: 2024-02-19 | Stop reason: SURG

## 2024-02-19 RX ORDER — MIDAZOLAM HYDROCHLORIDE 1 MG/ML
0.5 INJECTION INTRAMUSCULAR; INTRAVENOUS
Status: DISCONTINUED | OUTPATIENT
Start: 2024-02-19 | End: 2024-02-19 | Stop reason: HOSPADM

## 2024-02-19 RX ORDER — GENTAMICIN SULFATE 80 MG/100ML
80 INJECTION, SOLUTION INTRAVENOUS ONCE
Status: COMPLETED | OUTPATIENT
Start: 2024-02-19 | End: 2024-02-19

## 2024-02-19 RX ORDER — FENTANYL CITRATE 50 UG/ML
INJECTION, SOLUTION INTRAMUSCULAR; INTRAVENOUS AS NEEDED
Status: DISCONTINUED | OUTPATIENT
Start: 2024-02-19 | End: 2024-02-19 | Stop reason: SURG

## 2024-02-19 RX ORDER — ONDANSETRON 2 MG/ML
4 INJECTION INTRAMUSCULAR; INTRAVENOUS AS NEEDED
Status: DISCONTINUED | OUTPATIENT
Start: 2024-02-19 | End: 2024-02-19 | Stop reason: HOSPADM

## 2024-02-19 RX ORDER — PROPOFOL 10 MG/ML
INJECTION, EMULSION INTRAVENOUS AS NEEDED
Status: DISCONTINUED | OUTPATIENT
Start: 2024-02-19 | End: 2024-02-19 | Stop reason: SURG

## 2024-02-19 RX ORDER — FENTANYL CITRATE 50 UG/ML
50 INJECTION, SOLUTION INTRAMUSCULAR; INTRAVENOUS
Status: DISCONTINUED | OUTPATIENT
Start: 2024-02-19 | End: 2024-02-19 | Stop reason: HOSPADM

## 2024-02-19 RX ORDER — SODIUM CHLORIDE, SODIUM LACTATE, POTASSIUM CHLORIDE, CALCIUM CHLORIDE 600; 310; 30; 20 MG/100ML; MG/100ML; MG/100ML; MG/100ML
125 INJECTION, SOLUTION INTRAVENOUS ONCE
Status: COMPLETED | OUTPATIENT
Start: 2024-02-19 | End: 2024-02-19

## 2024-02-19 RX ORDER — LIDOCAINE HYDROCHLORIDE 20 MG/ML
JELLY TOPICAL AS NEEDED
Status: DISCONTINUED | OUTPATIENT
Start: 2024-02-19 | End: 2024-02-19 | Stop reason: HOSPADM

## 2024-02-19 RX ORDER — IPRATROPIUM BROMIDE AND ALBUTEROL SULFATE 2.5; .5 MG/3ML; MG/3ML
3 SOLUTION RESPIRATORY (INHALATION) ONCE AS NEEDED
Status: DISCONTINUED | OUTPATIENT
Start: 2024-02-19 | End: 2024-02-19 | Stop reason: HOSPADM

## 2024-02-19 RX ORDER — SODIUM CHLORIDE 9 MG/ML
40 INJECTION, SOLUTION INTRAVENOUS AS NEEDED
Status: DISCONTINUED | OUTPATIENT
Start: 2024-02-19 | End: 2024-02-19 | Stop reason: HOSPADM

## 2024-02-19 RX ORDER — MAGNESIUM HYDROXIDE 1200 MG/15ML
LIQUID ORAL AS NEEDED
Status: DISCONTINUED | OUTPATIENT
Start: 2024-02-19 | End: 2024-02-19 | Stop reason: HOSPADM

## 2024-02-19 RX ORDER — SODIUM CHLORIDE, SODIUM LACTATE, POTASSIUM CHLORIDE, CALCIUM CHLORIDE 600; 310; 30; 20 MG/100ML; MG/100ML; MG/100ML; MG/100ML
100 INJECTION, SOLUTION INTRAVENOUS ONCE AS NEEDED
Status: DISCONTINUED | OUTPATIENT
Start: 2024-02-19 | End: 2024-02-19 | Stop reason: HOSPADM

## 2024-02-19 RX ORDER — OXYCODONE AND ACETAMINOPHEN 10; 325 MG/1; MG/1
1 TABLET ORAL EVERY 6 HOURS PRN
Qty: 12 TABLET | Refills: 0 | Status: SHIPPED | OUTPATIENT
Start: 2024-02-19

## 2024-02-19 RX ORDER — OXYCODONE HYDROCHLORIDE AND ACETAMINOPHEN 5; 325 MG/1; MG/1
1 TABLET ORAL ONCE AS NEEDED
Status: COMPLETED | OUTPATIENT
Start: 2024-02-19 | End: 2024-02-19

## 2024-02-19 RX ORDER — SODIUM CHLORIDE 0.9 % (FLUSH) 0.9 %
10 SYRINGE (ML) INJECTION AS NEEDED
Status: DISCONTINUED | OUTPATIENT
Start: 2024-02-19 | End: 2024-02-19 | Stop reason: HOSPADM

## 2024-02-19 RX ORDER — LIDOCAINE HYDROCHLORIDE 20 MG/ML
INJECTION, SOLUTION EPIDURAL; INFILTRATION; INTRACAUDAL; PERINEURAL AS NEEDED
Status: DISCONTINUED | OUTPATIENT
Start: 2024-02-19 | End: 2024-02-19 | Stop reason: SURG

## 2024-02-19 RX ADMIN — GENTAMICIN SULFATE 80 MG: 80 INJECTION, SOLUTION INTRAVENOUS at 11:14

## 2024-02-19 RX ADMIN — PROPOFOL 150 MG: 10 INJECTION, EMULSION INTRAVENOUS at 11:07

## 2024-02-19 RX ADMIN — LIDOCAINE HYDROCHLORIDE 60 MG: 20 INJECTION, SOLUTION EPIDURAL; INFILTRATION; INTRACAUDAL; PERINEURAL at 11:07

## 2024-02-19 RX ADMIN — FAMOTIDINE 20 MG: 10 INJECTION, SOLUTION INTRAVENOUS at 11:12

## 2024-02-19 RX ADMIN — SODIUM CHLORIDE, POTASSIUM CHLORIDE, SODIUM LACTATE AND CALCIUM CHLORIDE: 600; 310; 30; 20 INJECTION, SOLUTION INTRAVENOUS at 11:04

## 2024-02-19 RX ADMIN — ONDANSETRON 4 MG: 2 INJECTION INTRAMUSCULAR; INTRAVENOUS at 11:12

## 2024-02-19 RX ADMIN — FENTANYL CITRATE 50 MCG: 50 INJECTION INTRAMUSCULAR; INTRAVENOUS at 11:07

## 2024-02-19 RX ADMIN — FENTANYL CITRATE 50 MCG: 50 INJECTION INTRAMUSCULAR; INTRAVENOUS at 11:18

## 2024-02-19 RX ADMIN — FENTANYL CITRATE 50 MCG: 50 INJECTION, SOLUTION INTRAMUSCULAR; INTRAVENOUS at 12:05

## 2024-02-19 RX ADMIN — OXYCODONE HYDROCHLORIDE AND ACETAMINOPHEN 1 TABLET: 5; 325 TABLET ORAL at 12:06

## 2024-02-19 NOTE — ANESTHESIA PREPROCEDURE EVALUATION
Anesthesia Evaluation     Patient summary reviewed and Nursing notes reviewed   no history of anesthetic complications:   NPO Solid Status: > 8 hours  NPO Liquid Status: > 8 hours           Airway   Mallampati: II  TM distance: >3 FB  Neck ROM: full  No difficulty expected  Dental - normal exam   (+) upper dentures and lower dentures    Pulmonary - normal exam    breath sounds clear to auscultation  (+) a smoker Former,sleep apnea  Cardiovascular - normal exam    ECG reviewed  PT is on anticoagulation therapy  Patient on routine beta blocker and Beta blocker given within 24 hours of surgery  Rhythm: regular  Rate: normal    (+) hypertension, CAD, dysrhythmias, hyperlipidemia      Neuro/Psych  (+) headaches, psychiatric history Depression  GI/Hepatic/Renal/Endo    (+) obesity, morbid obesity, renal disease-    Musculoskeletal     (+) back pain, chronic pain  Abdominal  - normal exam   Substance History - negative use     OB/GYN negative ob/gyn ROS         Other   arthritis,     ROS/Med Hx Other: Cardiac Cath 7/28/2023:  Final impression:  Right dominant system with minimal nonobstructive coronary artery disease with about 20% mid LAD and normal irregularities of the circumflex artery with somewhat ectatic RCA with no significant stenosis.  Left ventricular systolic function was normal with ejection fraction of 61-65%           RECOMMENDATIONS:  Patient continue aggressive secondary preventive measurements of coronary disease and follow-up with electrophysiology for further management of nonsustained ventricular tachycardia.    Echo 6/27/2023:  ·  Left ventricular systolic function is normal. Left ventricular ejection fraction appears to be 56 - 60%.  ·  Left ventricular diastolic function is consistent with (grade I) impaired relaxation.  ·  The right ventricular cavity is borderline dilated.  ·  The left atrial cavity is mild to moderately dilated.  ·  The right atrial cavity is mildly  dilated.  ·  There is mild  calcification of the aortic valve mainly affecting the right coronary cusp(s).  ·  Estimated right ventricular systolic pressure from tricuspid regurgitation is normal (<35 mmHg).                   Anesthesia Plan    ASA 3     general     intravenous induction     Anesthetic plan, risks, benefits, and alternatives have been provided, discussed and informed consent has been obtained with: patient.  Pre-procedure education provided  Plan discussed with CRNA.    CODE STATUS:

## 2024-02-19 NOTE — ANESTHESIA PROCEDURE NOTES
Airway  Urgency: elective    Date/Time: 2/19/2024 11:09 AM    General Information and Staff    Patient location during procedure: OR    Indications and Patient Condition    Preoxygenated: yes  Mask difficulty assessment: 0 - not attempted    Final Airway Details  Final airway type: supraglottic airway      Successful airway: LMA  Size 4     Number of attempts at approach: 1  Assessment: lips, teeth, and gum same as pre-op

## 2024-02-19 NOTE — OP NOTE
Williams Lora  2/19/2024    Pre-op Diagnosis:   Right ureteral stone [N20.1]    Post-op Diagnosis:     Post-Op Diagnosis Codes:     * Right ureteral stone [N20.1]    Procedure/CPT® Codes:  73-year-old white male with an impacted right proximal ureteral stone and a renal calculus as well the status post stone treatment 6 years ago in [place].  He is having considerable pain.  Following an extensive informed consent brought the procedure suite prepped and draped in a low dorsolithotomy position I used the Fortune 4.5 Estonian ureteroscope easily entered the right orifice and passed it to the mid where it was very narrowed.  Because of the narrowing I went ahead and elected to place a stent to manipulate that stone into the kidney and placed an angiographic Glidewire 5 x 26 double-J stent in perfect position visually and fluoroscopically and I will recommend lithotripsy on Friday.    Procedure(s):  URETEROSCOPY  STENT INSERTION  Fluoroscopy less than 30 minutes    Surgeon(s):  Salvatore Cool MD    Anesthesia: see anesthesia record    Staff:   Circulator: Mariana Huizar RN  Scrub Person: Melva Baxter LPN; Rebecca Funk    Estimated Blood Loss: none  Urine Voided: * No values recorded between 2/19/2024 11:04 AM and 2/19/2024 11:27 AM *    Specimens:                None      Drains: 5 x 26 double-J stent    Findings: Right impacted ureteral calculus    Blood: N/A    Complications: None    Grafts and Implants: None    Salvatore Cool MD     Date: 2/19/2024  Time: 11:33 EST

## 2024-02-19 NOTE — ANESTHESIA POSTPROCEDURE EVALUATION
Patient: Williams Lora    Procedure Summary       Date: 02/19/24 Room / Location: [facility] OR 06 /  [facility] OR    Anesthesia Start: 1105 Anesthesia Stop: 1127    Procedure: URETEROSCOPY  with STENT INSERTION (Right) Diagnosis:       Right ureteral stone      (Right ureteral stone [N20.1])    Surgeons: Salvatore Cool MD Provider: Pranav Delvalle DO    Anesthesia Type: general ASA Status: 3            Anesthesia Type: general    Vitals  Vitals Value Taken Time   /74 02/19/24 1134   Temp 97.6 °F (36.4 °C) 02/19/24 1129   Pulse 75 02/19/24 1134   Resp 15 02/19/24 1134   SpO2 97 % 02/19/24 1134           Post Anesthesia Care and Evaluation    Patient location during evaluation: PHASE II  Patient participation: complete - patient participated  Level of consciousness: awake and alert  Pain score: 1  Pain management: adequate    Airway patency: patent  Anesthetic complications: No anesthetic complications  PONV Status: controlled  Cardiovascular status: acceptable  Respiratory status: acceptable  Hydration status: acceptable

## 2024-02-23 ENCOUNTER — ANESTHESIA (OUTPATIENT)
Dept: PERIOP | Facility: HOSPITAL | Age: 74
End: 2024-02-23
Payer: MEDICARE

## 2024-02-23 ENCOUNTER — HOSPITAL ENCOUNTER (OUTPATIENT)
Facility: HOSPITAL | Age: 74
Setting detail: HOSPITAL OUTPATIENT SURGERY
Discharge: HOME | End: 2024-02-23
Attending: UROLOGY | Admitting: UROLOGY
Payer: MEDICARE

## 2024-02-23 ENCOUNTER — APPOINTMENT (OUTPATIENT)
Dept: GENERAL RADIOLOGY | Facility: HOSPITAL | Age: 74
End: 2024-02-23
Payer: MEDICARE

## 2024-02-23 ENCOUNTER — ANESTHESIA EVENT (OUTPATIENT)
Dept: PERIOP | Facility: HOSPITAL | Age: 74
End: 2024-02-23
Payer: MEDICARE

## 2024-02-23 VITALS
BODY MASS INDEX: 27.77 KG/M2 | HEART RATE: 68 BPM | DIASTOLIC BLOOD PRESSURE: 74 MMHG | RESPIRATION RATE: 18 BRPM | WEIGHT: 205 LBS | TEMPERATURE: 98 F | SYSTOLIC BLOOD PRESSURE: 118 MMHG | OXYGEN SATURATION: 96 % | HEIGHT: 72 IN

## 2024-02-23 PROCEDURE — 74018 RADEX ABDOMEN 1 VIEW: CPT

## 2024-02-23 PROCEDURE — 52310 CYSTOSCOPY AND TREATMENT: CPT | Performed by: UROLOGY

## 2024-02-23 PROCEDURE — 25810000003 LACTATED RINGERS PER 1000 ML: Performed by: NURSE ANESTHETIST, CERTIFIED REGISTERED

## 2024-02-23 PROCEDURE — 25010000002 GENTAMICIN PER 80 MG: Performed by: UROLOGY

## 2024-02-23 PROCEDURE — 25010000002 FENTANYL CITRATE (PF) 50 MCG/ML SOLUTION: Performed by: NURSE ANESTHETIST, CERTIFIED REGISTERED

## 2024-02-23 PROCEDURE — 25010000002 PROPOFOL 200 MG/20ML EMULSION: Performed by: NURSE ANESTHETIST, CERTIFIED REGISTERED

## 2024-02-23 PROCEDURE — 25010000002 ONDANSETRON PER 1 MG: Performed by: NURSE ANESTHETIST, CERTIFIED REGISTERED

## 2024-02-23 PROCEDURE — 74018 RADEX ABDOMEN 1 VIEW: CPT | Performed by: RADIOLOGY

## 2024-02-23 PROCEDURE — 25810000003 LACTATED RINGERS PER 1000 ML: Performed by: ANESTHESIOLOGY

## 2024-02-23 PROCEDURE — 50590 FRAGMENTING OF KIDNEY STONE: CPT | Performed by: UROLOGY

## 2024-02-23 RX ORDER — LIDOCAINE HYDROCHLORIDE 20 MG/ML
INJECTION, SOLUTION EPIDURAL; INFILTRATION; INTRACAUDAL; PERINEURAL AS NEEDED
Status: DISCONTINUED | OUTPATIENT
Start: 2024-02-23 | End: 2024-02-23 | Stop reason: SURG

## 2024-02-23 RX ORDER — FAMOTIDINE 10 MG/ML
INJECTION, SOLUTION INTRAVENOUS AS NEEDED
Status: DISCONTINUED | OUTPATIENT
Start: 2024-02-23 | End: 2024-02-23 | Stop reason: SURG

## 2024-02-23 RX ORDER — LIDOCAINE HYDROCHLORIDE 20 MG/ML
JELLY TOPICAL AS NEEDED
Status: DISCONTINUED | OUTPATIENT
Start: 2024-02-23 | End: 2024-02-23 | Stop reason: HOSPADM

## 2024-02-23 RX ORDER — SODIUM CHLORIDE 9 MG/ML
40 INJECTION, SOLUTION INTRAVENOUS AS NEEDED
Status: DISCONTINUED | OUTPATIENT
Start: 2024-02-23 | End: 2024-02-23 | Stop reason: HOSPADM

## 2024-02-23 RX ORDER — IPRATROPIUM BROMIDE AND ALBUTEROL SULFATE 2.5; .5 MG/3ML; MG/3ML
3 SOLUTION RESPIRATORY (INHALATION) ONCE AS NEEDED
Status: DISCONTINUED | OUTPATIENT
Start: 2024-02-23 | End: 2024-02-23 | Stop reason: HOSPADM

## 2024-02-23 RX ORDER — SODIUM CHLORIDE 0.9 % (FLUSH) 0.9 %
10 SYRINGE (ML) INJECTION AS NEEDED
Status: DISCONTINUED | OUTPATIENT
Start: 2024-02-23 | End: 2024-02-23 | Stop reason: HOSPADM

## 2024-02-23 RX ORDER — SODIUM CHLORIDE, SODIUM LACTATE, POTASSIUM CHLORIDE, CALCIUM CHLORIDE 600; 310; 30; 20 MG/100ML; MG/100ML; MG/100ML; MG/100ML
INJECTION, SOLUTION INTRAVENOUS CONTINUOUS PRN
Status: DISCONTINUED | OUTPATIENT
Start: 2024-02-23 | End: 2024-02-23 | Stop reason: SURG

## 2024-02-23 RX ORDER — SODIUM CHLORIDE 0.9 % (FLUSH) 0.9 %
10 SYRINGE (ML) INJECTION EVERY 12 HOURS SCHEDULED
Status: DISCONTINUED | OUTPATIENT
Start: 2024-02-23 | End: 2024-02-23 | Stop reason: HOSPADM

## 2024-02-23 RX ORDER — EPHEDRINE SULFATE 5 MG/ML
INJECTION INTRAVENOUS AS NEEDED
Status: DISCONTINUED | OUTPATIENT
Start: 2024-02-23 | End: 2024-02-23 | Stop reason: SURG

## 2024-02-23 RX ORDER — MIDAZOLAM HYDROCHLORIDE 1 MG/ML
0.5 INJECTION INTRAMUSCULAR; INTRAVENOUS
Status: DISCONTINUED | OUTPATIENT
Start: 2024-02-23 | End: 2024-02-23 | Stop reason: HOSPADM

## 2024-02-23 RX ORDER — SODIUM CHLORIDE, SODIUM LACTATE, POTASSIUM CHLORIDE, CALCIUM CHLORIDE 600; 310; 30; 20 MG/100ML; MG/100ML; MG/100ML; MG/100ML
100 INJECTION, SOLUTION INTRAVENOUS ONCE AS NEEDED
Status: DISCONTINUED | OUTPATIENT
Start: 2024-02-23 | End: 2024-02-23 | Stop reason: HOSPADM

## 2024-02-23 RX ORDER — FENTANYL CITRATE 50 UG/ML
INJECTION, SOLUTION INTRAMUSCULAR; INTRAVENOUS AS NEEDED
Status: DISCONTINUED | OUTPATIENT
Start: 2024-02-23 | End: 2024-02-23 | Stop reason: SURG

## 2024-02-23 RX ORDER — FENTANYL CITRATE 50 UG/ML
50 INJECTION, SOLUTION INTRAMUSCULAR; INTRAVENOUS
Status: DISCONTINUED | OUTPATIENT
Start: 2024-02-23 | End: 2024-02-23 | Stop reason: HOSPADM

## 2024-02-23 RX ORDER — OXYCODONE HYDROCHLORIDE AND ACETAMINOPHEN 5; 325 MG/1; MG/1
1 TABLET ORAL ONCE AS NEEDED
Status: COMPLETED | OUTPATIENT
Start: 2024-02-23 | End: 2024-02-23

## 2024-02-23 RX ORDER — PROPOFOL 10 MG/ML
INJECTION, EMULSION INTRAVENOUS AS NEEDED
Status: DISCONTINUED | OUTPATIENT
Start: 2024-02-23 | End: 2024-02-23 | Stop reason: SURG

## 2024-02-23 RX ORDER — ONDANSETRON 2 MG/ML
4 INJECTION INTRAMUSCULAR; INTRAVENOUS AS NEEDED
Status: DISCONTINUED | OUTPATIENT
Start: 2024-02-23 | End: 2024-02-23 | Stop reason: HOSPADM

## 2024-02-23 RX ORDER — ONDANSETRON 2 MG/ML
INJECTION INTRAMUSCULAR; INTRAVENOUS AS NEEDED
Status: DISCONTINUED | OUTPATIENT
Start: 2024-02-23 | End: 2024-02-23 | Stop reason: SURG

## 2024-02-23 RX ORDER — SODIUM CHLORIDE, SODIUM LACTATE, POTASSIUM CHLORIDE, CALCIUM CHLORIDE 600; 310; 30; 20 MG/100ML; MG/100ML; MG/100ML; MG/100ML
125 INJECTION, SOLUTION INTRAVENOUS ONCE
Status: COMPLETED | OUTPATIENT
Start: 2024-02-23 | End: 2024-02-23

## 2024-02-23 RX ADMIN — LIDOCAINE HYDROCHLORIDE 60 MG: 20 INJECTION, SOLUTION EPIDURAL; INFILTRATION; INTRACAUDAL; PERINEURAL at 11:49

## 2024-02-23 RX ADMIN — EPHEDRINE SULFATE 10 MG: 5 INJECTION INTRAVENOUS at 11:59

## 2024-02-23 RX ADMIN — OXYCODONE HYDROCHLORIDE AND ACETAMINOPHEN 1 TABLET: 5; 325 TABLET ORAL at 13:03

## 2024-02-23 RX ADMIN — ONDANSETRON 4 MG: 2 INJECTION INTRAMUSCULAR; INTRAVENOUS at 12:02

## 2024-02-23 RX ADMIN — FAMOTIDINE 20 MG: 10 INJECTION, SOLUTION INTRAVENOUS at 11:45

## 2024-02-23 RX ADMIN — FENTANYL CITRATE 100 MCG: 50 INJECTION INTRAMUSCULAR; INTRAVENOUS at 11:49

## 2024-02-23 RX ADMIN — SODIUM CHLORIDE, POTASSIUM CHLORIDE, SODIUM LACTATE AND CALCIUM CHLORIDE 125 ML/HR: 600; 310; 30; 20 INJECTION, SOLUTION INTRAVENOUS at 10:59

## 2024-02-23 RX ADMIN — PROPOFOL 100 MG: 10 INJECTION, EMULSION INTRAVENOUS at 11:49

## 2024-02-23 RX ADMIN — GENTAMICIN SULFATE 80 MG: 40 INJECTION, SOLUTION INTRAMUSCULAR; INTRAVENOUS at 11:44

## 2024-02-23 RX ADMIN — FENTANYL CITRATE 50 MCG: 50 INJECTION, SOLUTION INTRAMUSCULAR; INTRAVENOUS at 13:07

## 2024-02-23 RX ADMIN — EPHEDRINE SULFATE 10 MG: 5 INJECTION INTRAVENOUS at 12:08

## 2024-02-23 RX ADMIN — EPHEDRINE SULFATE 10 MG: 5 INJECTION INTRAVENOUS at 12:11

## 2024-02-23 RX ADMIN — EPHEDRINE SULFATE 10 MG: 5 INJECTION INTRAVENOUS at 12:02

## 2024-02-23 RX ADMIN — FENTANYL CITRATE 50 MCG: 50 INJECTION, SOLUTION INTRAMUSCULAR; INTRAVENOUS at 13:02

## 2024-02-23 RX ADMIN — SODIUM CHLORIDE, POTASSIUM CHLORIDE, SODIUM LACTATE AND CALCIUM CHLORIDE: 600; 310; 30; 20 INJECTION, SOLUTION INTRAVENOUS at 11:44

## 2024-02-23 NOTE — OP NOTE
Williams Lora  2/23/2024    Pre-op Diagnosis:   Right ureteral stone [N20.1]    Post-op Diagnosis:     Post-Op Diagnosis Codes:     * Right ureteral stone [N20.1]    Procedure/CPT® Codes:  73-year-old white male with bilateral lower pole stones and an obstructing right upper ureteral stone causing significant pain.  ESWL-the patient is a candidate for extracorporeal shockwave lithotripsy.  We discussed the type of stone and the complications associated with the procedure including, but not limited to, pain in the flank, hematoma, spontaneous renal hemorrhage, inadequate fragmentation of stones, the need for passage of the stones, the need for concomitant additional procedures in the range of 24%, the risk of a distal fragment in the range of 3% requiring ureteroscopic removal, and the fact that sometimes a stent is indicated based on the size and the density of the stone as determined on the CAT scan.  Additionally, we discussed percutaneous nephrostolithotomy.  Including the mini PERC.  With its attendant risks of anesthesia bleeding infection and the fact that is a invasive procedure with the remote possibility of a nephrectomy.  We also discussed the use of ureteroscopy which is a rigid or flexible instrument placed up into the kidney to break up stones with the laser beam and very likely a postop stent and a high likelihood of additional concomitant procedures.  Following an informed consent, he was brought to the operative suite and underwent induction of general endotracheal anesthetic.  The stone was localized at F2 and a total of 3000 shockwaves was administered without complication.  There was excellent fragmentation.  Cystoscopy and stent removal: Following an appropriate informed consent including the risk of infection, the patient was pretreated with 80 mg of intramuscular gentamicin.  Following this, the patient was prepped and draped in a sterile fashion using the flexible cystoscope. I went  ahead and examined the bladder, identified the stent exiting the ureteral orifice.  It was grasped with grasping forceps and removed without complication.  The patient tolerated it well.  He was awake and alert and returned to recovery room.         Procedure(s):  EXTRACORPOREAL SHOCKWAVE LITHOTRIPSY  STENT REMOVAL  Fluoroscopy less than 30 minutes    Surgeon(s):  Salvatore Cool MD    Anesthesia: see anesthesia record    Staff:   Circulator: Raina Lao RN; Mariana Huizar RN  Scrub Person: Melva Baxter LPN; Rebecca Funk    Estimated Blood Loss: none  Urine Voided: * No values recorded between 2/23/2024 11:43 AM and 2/23/2024 12:23 PM *    Specimens:                None      Drains: None    Findings: Excellent fragmentation    Blood: N/A    Complications: None    Grafts and Implants: None    Salvatore Cool MD     Date: 2/23/2024  Time: 12:24 EST

## 2024-02-23 NOTE — ANESTHESIA POSTPROCEDURE EVALUATION
Patient: Williams Lora    Procedure Summary       Date: 02/23/24 Room / Location: [facility] OR 09 /  [facility] OR    Anesthesia Start: 1144 Anesthesia Stop: 1223    Procedure: EXTRACORPOREAL SHOCKWAVE LITHOTRIPSY WITH STENT REMOVAL (Right) Diagnosis:       Right ureteral stone      (Right ureteral stone [N20.1])    Surgeons: Salvatore Cool MD Provider: Pranav Keenan MD    Anesthesia Type: general ASA Status: 3            Anesthesia Type: general    Vitals  Vitals Value Taken Time   /65 02/23/24 1254   Temp 97.8 °F (36.6 °C) 02/23/24 1224   Pulse 69 02/23/24 1254   Resp 20 02/23/24 1254   SpO2 95 % 02/23/24 1254           Post Anesthesia Care and Evaluation    Patient location during evaluation: PHASE II  Patient participation: complete - patient participated  Level of consciousness: awake and alert  Pain score: 1  Pain management: adequate    Airway patency: patent  Anesthetic complications: No anesthetic complications  PONV Status: controlled  Cardiovascular status: acceptable  Respiratory status: acceptable  Hydration status: acceptable

## 2024-02-23 NOTE — ANESTHESIA PROCEDURE NOTES
Airway  Urgency: elective    Date/Time: 2/23/2024 11:49 AM  Airway not difficult    General Information and Staff    Patient location during procedure: OR  Anesthesiologist: Pranav Keenan MD  CRNA/CAA: Danielle Guzmán CRNA    Indications and Patient Condition  Indications for airway management: airway protection    Preoxygenated: yes  Mask difficulty assessment: 0 - not attempted    Final Airway Details  Final airway type: supraglottic airway      Successful airway: classic  Size 4     Number of attempts at approach: 1  Assessment: lips, teeth, and gum same as pre-op    Additional Comments  LMA placed with no trauma noted. Patient tolerated well. Good seal. Secured.

## 2024-02-26 DIAGNOSIS — I48.92 PAROXYSMAL ATRIAL FLUTTER: ICD-10-CM

## 2024-02-29 ENCOUNTER — OFFICE VISIT (OUTPATIENT)
Dept: UROLOGY | Facility: CLINIC | Age: 74
End: 2024-02-29
Payer: MEDICARE

## 2024-02-29 ENCOUNTER — HOSPITAL ENCOUNTER (OUTPATIENT)
Dept: GENERAL RADIOLOGY | Facility: HOSPITAL | Age: 74
Discharge: HOME | End: 2024-02-29
Admitting: UROLOGY
Payer: MEDICARE

## 2024-02-29 VITALS
HEART RATE: 59 BPM | DIASTOLIC BLOOD PRESSURE: 74 MMHG | WEIGHT: 208.4 LBS | SYSTOLIC BLOOD PRESSURE: 111 MMHG | BODY MASS INDEX: 28.23 KG/M2 | HEIGHT: 72 IN

## 2024-02-29 DIAGNOSIS — N20.1 RIGHT URETERAL STONE: ICD-10-CM

## 2024-02-29 DIAGNOSIS — N20.0 BILATERAL KIDNEY STONES: ICD-10-CM

## 2024-02-29 DIAGNOSIS — N20.1 RIGHT URETERAL STONE: Primary | ICD-10-CM

## 2024-02-29 PROCEDURE — 74018 RADEX ABDOMEN 1 VIEW: CPT

## 2024-02-29 PROCEDURE — 74018 RADEX ABDOMEN 1 VIEW: CPT | Performed by: RADIOLOGY

## 2024-02-29 RX ORDER — TAMSULOSIN HYDROCHLORIDE 0.4 MG/1
CAPSULE ORAL
COMMUNITY
Start: 2024-02-16

## 2024-02-29 RX ORDER — PRAVASTATIN SODIUM 40 MG
TABLET ORAL
COMMUNITY
Start: 2024-02-22

## 2024-02-29 NOTE — PROGRESS NOTES
Chief Complaint:      Chief Complaint   Patient presents with    Right ureteral stone       HPI:   73 y.o. male status post lithotripsy of a right upper pole stone he has multiple small calyceal stones in both kidneys.  He had a slight amount of blood and I will see him back in 1 year with KUB he is completely pain-free    Past Medical History:     Past Medical History:   Diagnosis Date    Afib     Arthritis     Chronic pain disorder     DDD (degenerative disc disease), lumbar     Depression     RECENT DEATH OF DAUGHTER    Elevated cholesterol     Extremity pain     Grief     Sudden loss of daughter in law 4 weeks ago    Headache     History of gastritis     History of gout     History of kidney stones     Hyperlipidemia     Hypertension     Low back pain     Lumbosacral disc disease     Migraines     Rheumatic fever     Sleep apnea     no c-pap yet-just diagnosed    Wears glasses        Current Meds:     Current Outpatient Medications   Medication Sig Dispense Refill    amLODIPine (NORVASC) 10 MG tablet TAKE 1 TABLET BY MOUTH EVERY DAY  3    apixaban (ELIQUIS) 5 MG tablet tablet Take 1 tablet by mouth 2 (Two) Times a Day. Indications: Atrial Fibrillation 60 tablet 3    metoprolol tartrate (LOPRESSOR) 25 MG tablet TAKE 1 TABLET BY MOUTH TWICE A  tablet 1    oxyCODONE-acetaminophen (Percocet)  MG per tablet Take 1 tablet by mouth Every 6 (Six) Hours As Needed for Moderate Pain (Pain). 12 tablet 0    oxyCODONE-acetaminophen (PERCOCET) 7.5-325 MG per tablet Take 1 tablet by mouth 2 (Two) Times a Day.      pravastatin (PRAVACHOL) 40 MG tablet       tamsulosin (FLOMAX) 0.4 MG capsule 24 hr capsule TAKE 1 CAPSULE BY MOUTH EVERY DAY AT BEDTIME FOR 30 DAYS      traZODone (DESYREL) 50 MG tablet TAKE 1 TO 3 TABLETS AT BEDTIME.  2     No current facility-administered medications for this visit.        Allergies:      No Known Allergies     Past Surgical History:     Past Surgical History:   Procedure Laterality  Date    BACK SURGERY      CARDIAC CATHETERIZATION N/A 2023    Procedure: Left Heart Cath (Eliquis instructions per Dr. Fournier);  Surgeon: Roro Blackmon MD;  Location: [facility] CATH INVASIVE LOCATION;  Service: Cardiology;  Laterality: N/A;    CHOLECYSTECTOMY      COLONOSCOPY      CYSTOSCOPY W/ LITHOLAPAXY / EHL      ENDOSCOPY      EXTRACORPOREAL SHOCK WAVE LITHOTRIPSY (ESWL) Right 2024    Procedure: EXTRACORPOREAL SHOCKWAVE LITHOTRIPSY WITH STENT REMOVAL;  Surgeon: Salvatore Cool MD;  Location: [facility] OR;  Service: Urology;  Laterality: Right;    KIDNEY STONE SURGERY      LUMBAR FUSION  10/2013    posterior lumbar interbody fusion, L4-L5, Dr. Raad Rivera    SPINAL CORD STIMULATOR IMPLANT  2016    SPINAL CORD STIMULATOR IMPLANT N/A 2016    Procedure: SPINAL CORD STIMULATOR INSERTION PHASE 1;  Surgeon: Dany Ratliff MD;  Location: [facility] OR;  Service:     URETEROSCOPY LASER LITHOTRIPSY WITH STENT INSERTION Right 2024    Procedure: URETEROSCOPY  with STENT INSERTION;  Surgeon: Salvatore Cool MD;  Location: [facility] OR;  Service: Urology;  Laterality: Right;       Social History:     Social History     Socioeconomic History    Marital status:    Tobacco Use    Smoking status: Former     Years: 20     Types: Cigarettes     Quit date: 1996     Years since quittin.5    Smokeless tobacco: Never   Vaping Use    Vaping Use: Never used   Substance and Sexual Activity    Alcohol use: No     Comment: Rarely    Drug use: No    Sexual activity: Defer       Family History:     Family History   Problem Relation Age of Onset    Heart disease Mother     Pulmonary fibrosis Father        Review of Systems:     Review of Systems   Constitutional: Negative.    HENT: Negative.     Eyes: Negative.    Respiratory: Negative.     Cardiovascular: Negative.    Gastrointestinal: Negative.    Endocrine: Negative.    Musculoskeletal: Negative.    Allergic/Immunologic: Negative.     Neurological: Negative.    Hematological: Negative.    Psychiatric/Behavioral: Negative.         Physical Exam:     Physical Exam  Vitals and nursing note reviewed.   Constitutional:       Appearance: He is well-developed.   HENT:      Head: Normocephalic and atraumatic.   Eyes:      Conjunctiva/sclera: Conjunctivae normal.      Pupils: Pupils are equal, round, and reactive to light.   Cardiovascular:      Rate and Rhythm: Normal rate and regular rhythm.      Heart sounds: Normal heart sounds.   Pulmonary:      Effort: Pulmonary effort is normal.      Breath sounds: Normal breath sounds.   Abdominal:      General: Bowel sounds are normal.      Palpations: Abdomen is soft.   Musculoskeletal:         General: Normal range of motion.      Cervical back: Normal range of motion.   Skin:     General: Skin is warm and dry.   Neurological:      Mental Status: He is alert and oriented to person, place, and time.      Deep Tendon Reflexes: Reflexes are normal and symmetric.   Psychiatric:         Behavior: Behavior normal.         Thought Content: Thought content normal.         Judgment: Judgment normal.         I have reviewed the following portions of the patient's history: Allergies, current medications, past family history, past medical history, past social history, past surgical history, problem list, and ROS and confirm it is accurate.    Recent Image (CT and/or KUB):      CT Abdomen and Pelvis: No results found for this or any previous visit.       CT Stone Protocol: Results for orders placed during the hospital encounter of 02/15/24    CT Abdomen Pelvis Stone Protocol    Narrative  EXAM: CT ABDOMEN PELVIS STONE PROTOCOL-      TECHNIQUE: Multiple axial CT images were obtained from lung bases  through pubic symphysis WITHOUT administration of IV contrast.  Reformatted images in the coronal and/or sagittal plane(s) were  generated from the axial data set to facilitate diagnostic accuracy  and/or surgical  planning.  Oral Contrast:NONE.    Radiation dose reduction techniques were utilized per ALARA protocol.  Automated exposure control was initiated through either or [employer] or  [employer] software packages by  protocol.  DOSE:    Clinical information Flank pain, kidney stone suspected    Comparison none immediately available at this time    FINDINGS:    Lower thorax: Clear. No effusions.    Abdomen:    Liver: Homogeneous. No focal hepatic mass or ductal dilatation.    Gallbladder: Surgically absent    Pancreas: Unremarkable. No mass or ductal dilatation.    Spleen: Homogeneous. No splenomegaly.    Adrenals: No mass.    Kidneys/ureters: Nonobstructing stones are present in both kidneys.  There is right-sided hydronephrosis and hydroureter which appears to be  due to 2 right mid ureteral stones. The largest measures 7 mm    GI tract: Non-dilated. No definite wall thickening.. There is no  evidence of appendicitis    MESENTERY: No free fluid, walled off fluid collections, mesenteric  stranding, or enlarged lymph nodes      Vasculature: Evidence of atherosclerotic vascular disease    Abdominal wall: No focal hernia or mass.      Bladder: No focal mass or significant wall thickening    Reproductive: Unremarkable as visualized    Bones: Postoperative hardware at L4-5    Impression  1. Obstructive uropathy on the right due to 2 right mid ureteral stones    2. Other findings as above                  This report was finalized on 2/15/2024 10:07 AM by Dr. Oliverio Mccray MD.       KUB: Results for orders placed during the hospital encounter of 02/23/24    XR Abdomen KUB    Narrative  PROCEDURE: XR ABDOMEN KUB-    HISTORY: stone    COMPARISON: None.    FINDINGS: An AP view of the abdomen and pelvis demonstrates an  unremarkable bowel gas pattern with no evidence of obstruction. There  are bilateral renal stones. A right ureteral stent is in place and  appears appropriately positioned. There is an 11 mm calcification  in the  right paraspinal soft tissues at the L3/4 level consistent with a  ureteral stone.    Impression  Right ureteral stent in place with an 11 mm stone at the  proximal end of the stent.          This report was finalized on 2/23/2024 10:33 AM by Rea Yadav M.D..       Labs (past 3 months):      Admission on 02/15/2024, Discharged on 02/15/2024   Component Date Value Ref Range Status    Glucose 02/15/2024 122 (H)  65 - 99 mg/dL Final    BUN 02/15/2024 30 (H)  8 - 23 mg/dL Final    Creatinine 02/15/2024 2.18 (H)  0.76 - 1.27 mg/dL Final    Sodium 02/15/2024 139  136 - 145 mmol/L Final    Potassium 02/15/2024 5.0  3.5 - 5.2 mmol/L Final    Chloride 02/15/2024 101  98 - 107 mmol/L Final    CO2 02/15/2024 22.7  22.0 - 29.0 mmol/L Final    Calcium 02/15/2024 10.1  8.6 - 10.5 mg/dL Final    Total Protein 02/15/2024 7.6  6.0 - 8.5 g/dL Final    Albumin 02/15/2024 4.4  3.5 - 5.2 g/dL Final    ALT (SGPT) 02/15/2024 15  1 - 41 U/L Final    AST (SGOT) 02/15/2024 20  1 - 40 U/L Final    Alkaline Phosphatase 02/15/2024 81  39 - 117 U/L Final    Total Bilirubin 02/15/2024 0.4  0.0 - 1.2 mg/dL Final    Globulin 02/15/2024 3.2  gm/dL Final    A/G Ratio 02/15/2024 1.4  g/dL Final    BUN/Creatinine Ratio 02/15/2024 13.8  7.0 - 25.0 Final    Anion Gap 02/15/2024 15.3 (H)  5.0 - 15.0 mmol/L Final    eGFR 02/15/2024 31.2 (L)  >60.0 mL/min/1.73 Final    Lipase 02/15/2024 12 (L)  13 - 60 U/L Final    Color, UA 02/15/2024 Yellow  Yellow, Straw Final    Appearance, UA 02/15/2024 Clear  Clear Final    pH, UA 02/15/2024 5.5  5.0 - 8.0 Final    Specific Gravity, UA 02/15/2024 1.018  1.005 - 1.030 Final    Glucose, UA 02/15/2024 Negative  Negative Final    Ketones, UA 02/15/2024 15 mg/dL (1+) (A)  Negative Final    Bilirubin, UA 02/15/2024 Negative  Negative Final    Blood, UA 02/15/2024 Large (3+) (A)  Negative Final    Protein, UA 02/15/2024 Negative  Negative Final    Leuk Esterase, UA 02/15/2024 Negative  Negative Final     Nitrite, UA 02/15/2024 Negative  Negative Final    Urobilinogen, UA 02/15/2024 0.2 E.U./dL  0.2 - 1.0 E.U./dL Final    C-Reactive Protein 02/15/2024 2.18 (H)  0.00 - 0.50 mg/dL Final    Sed Rate 02/15/2024 42 (H)  0 - 20 mm/hr Final    WBC 02/15/2024 10.00  3.40 - 10.80 10*3/mm3 Final    RBC 02/15/2024 4.72  4.14 - 5.80 10*6/mm3 Final    Hemoglobin 02/15/2024 14.2  13.0 - 17.7 g/dL Final    Hematocrit 02/15/2024 44.2  37.5 - 51.0 % Final    MCV 02/15/2024 93.6  79.0 - 97.0 fL Final    MCH 02/15/2024 30.1  26.6 - 33.0 pg Final    MCHC 02/15/2024 32.1  31.5 - 35.7 g/dL Final    RDW 02/15/2024 11.9 (L)  12.3 - 15.4 % Final    RDW-SD 02/15/2024 41.1  37.0 - 54.0 fl Final    MPV 02/15/2024 10.0  6.0 - 12.0 fL Final    Platelets 02/15/2024 219  140 - 450 10*3/mm3 Final    Neutrophil % 02/15/2024 75.2  42.7 - 76.0 % Final    Lymphocyte % 02/15/2024 15.6 (L)  19.6 - 45.3 % Final    Monocyte % 02/15/2024 7.7  5.0 - 12.0 % Final    Eosinophil % 02/15/2024 0.7  0.3 - 6.2 % Final    Basophil % 02/15/2024 0.4  0.0 - 1.5 % Final    Immature Grans % 02/15/2024 0.4  0.0 - 0.5 % Final    Neutrophils, Absolute 02/15/2024 7.52 (H)  1.70 - 7.00 10*3/mm3 Final    Lymphocytes, Absolute 02/15/2024 1.56  0.70 - 3.10 10*3/mm3 Final    Monocytes, Absolute 02/15/2024 0.77  0.10 - 0.90 10*3/mm3 Final    Eosinophils, Absolute 02/15/2024 0.07  0.00 - 0.40 10*3/mm3 Final    Basophils, Absolute 02/15/2024 0.04  0.00 - 0.20 10*3/mm3 Final    Immature Grans, Absolute 02/15/2024 0.04  0.00 - 0.05 10*3/mm3 Final    nRBC 02/15/2024 0.0  0.0 - 0.2 /100 WBC Final    Extra Tube 02/15/2024 Hold for add-ons.   Final    Auto resulted.    Extra Tube 02/15/2024 hold for add-on   Final    Auto resulted    Extra Tube 02/15/2024 Hold for add-ons.   Final    Auto resulted.    Extra Tube 02/15/2024 Hold for add-ons.   Final    Auto resulted    RBC, UA 02/15/2024 Too Numerous to Count (A)  None Seen, 0-2 /HPF Final    WBC, UA 02/15/2024 0-2  None Seen, 0-2 /HPF  Final    Urine culture not indicated.    Bacteria, UA 02/15/2024 None Seen  None Seen /HPF Final    Squamous Epithelial Cells, UA 02/15/2024 None Seen  None Seen, 0-2 /HPF Final    Hyaline Casts, UA 02/15/2024 None Seen  None Seen /LPF Final    Methodology 02/15/2024 Automated Microscopy   Final        Procedure:       Assessment/Plan:   Renal calculus-we discussed the presence of the stone. We discussed the various therapeutic options available including percutaneous nephrostolithotomy, ureteroscopy and extracorporeal shockwave  lithotripsy.  We discussed the risks of lithotripsy including the passage of stones, the development of a large string of stones in the distal ureter known as Steinstrasse.  In the 3% incidence of that we will need to proceed with a ureteroscopy for obstructing fragments.  Extremely rare incidence of renal hematoma and the significance of this.  We discussed percutaneous nephrostolithotomy and its use as well as the risks and benefits such as the need for postoperative hospitalization, and the risk of damage to the kidney and the remote risk of a nephrectomy.  We also discussed the use of ureteroscopy in the upper tracts.  That this is as a decreased success rate to completely remove the stones on the first option and that very likely a stent will be required requiring an additional procedure for removal.  We discussed the absolute relative indicators for intervention including the presence of sepsis and pain we cannot control ais the primary need for urgent intervention.  We discussed placement of a stent if indicated and the management of the stent as well.  We targeted the UPJ stone was very successful is completely pain-free he does have bilateral other stones however.  This will need to be watched I will see him back in 1 year  Metabolic stone disease-discussed her stone analysis, discussed work-up including a 24-hour Litholink where indicated.  Discussed medical therapy including  thiazide and Urocit-K that are specific to specific types of stones, discussed increasing fluids and avoidance of cola products and anything containing high amounts of oxalates.              This document has been electronically signed by CLAUDIA PALACIOS MD February 29, 2024 09:53 EST    Dictated Utilizing Dragon Dictation: Part of this note may be an electronic transcription/translation of spoken language to printed text using the Dragon Dictation System.

## 2024-03-04 ENCOUNTER — OFFICE VISIT (OUTPATIENT)
Dept: CARDIOLOGY | Facility: CLINIC | Age: 74
End: 2024-03-04
Payer: MEDICARE

## 2024-03-04 VITALS
DIASTOLIC BLOOD PRESSURE: 60 MMHG | HEART RATE: 52 BPM | OXYGEN SATURATION: 96 % | SYSTOLIC BLOOD PRESSURE: 110 MMHG | BODY MASS INDEX: 28.63 KG/M2 | WEIGHT: 211.4 LBS | HEIGHT: 72 IN

## 2024-03-04 DIAGNOSIS — I47.29 NSVT (NONSUSTAINED VENTRICULAR TACHYCARDIA): ICD-10-CM

## 2024-03-04 DIAGNOSIS — Z79.01 CHRONIC ANTICOAGULATION: Primary | ICD-10-CM

## 2024-03-04 DIAGNOSIS — I49.3 PVC'S (PREMATURE VENTRICULAR CONTRACTIONS): ICD-10-CM

## 2024-03-04 DIAGNOSIS — I48.92 ATRIAL FLUTTER, UNSPECIFIED TYPE: ICD-10-CM

## 2024-03-04 DIAGNOSIS — G47.33 OSA (OBSTRUCTIVE SLEEP APNEA): ICD-10-CM

## 2024-03-04 DIAGNOSIS — I10 ESSENTIAL HYPERTENSION: ICD-10-CM

## 2024-03-04 PROBLEM — R07.2 PRECORDIAL CHEST PAIN: Status: RESOLVED | Noted: 2023-05-23 | Resolved: 2024-03-04

## 2024-03-04 PROBLEM — N20.1 RIGHT URETERAL STONE: Status: RESOLVED | Noted: 2024-02-15 | Resolved: 2024-03-04

## 2024-03-04 PROCEDURE — 1160F RVW MEDS BY RX/DR IN RCRD: CPT | Performed by: PHYSICIAN ASSISTANT

## 2024-03-04 PROCEDURE — 3078F DIAST BP <80 MM HG: CPT | Performed by: PHYSICIAN ASSISTANT

## 2024-03-04 PROCEDURE — 99214 OFFICE O/P EST MOD 30 MIN: CPT | Performed by: PHYSICIAN ASSISTANT

## 2024-03-04 PROCEDURE — 1159F MED LIST DOCD IN RCRD: CPT | Performed by: PHYSICIAN ASSISTANT

## 2024-03-04 PROCEDURE — 3074F SYST BP LT 130 MM HG: CPT | Performed by: PHYSICIAN ASSISTANT

## 2024-03-04 NOTE — PROGRESS NOTES
Encounter Date:03/04/2024      Patient ID: Williams Lora is a 73 y.o. male.    Juanis Carreon MD    Cheif Complaint EP: NSVT and PVC's    PROBLEM LIST:  Patient Active Problem List    Diagnosis Date Noted    NSVT (nonsustained ventricular tachycardia) 07/10/2023     Priority: High     Note Last Updated: 7/10/2023     Event monitor, 5/2023: Normal sinus rhythm with frequent PVCs frequent runs of ventricular tachycardia up to 7 beats and possible atrial flutter      PVC's (premature ventricular contractions) 05/23/2023     Priority: High     Note Last Updated: 7/10/2023     Event monitor, 05/2023: PVC burden of 26%       ASCVD (arteriosclerotic cardiovascular disease) 02/12/2024     Priority: Medium     Note Last Updated: 3/4/2024     [facility], 7/28/2023: Minor, nonobstructive CAD      Chronic anticoagulation 03/04/2024     Priority: Low    ADE (obstructive sleep apnea) 03/04/2024     Priority: Low    Essential hypertension 06/21/2016     Priority: Low    Bilateral kidney stones 02/15/2024    Prediabetes 05/23/2023    Cigarette nicotine dependence in remission 05/23/2023    Dyslipidemia 09/28/2017    Lumbar facet arthropathy 06/21/2016    Hx of migraine headaches 06/21/2016    Moderate obesity 06/21/2016    Insomnia 06/21/2016               History of Present Illness  Patient presents today for follow-up with a history of NSVT, frequent PVCs.  Since initial evaluation by our service he had cardiac catheterization which showed only minor nonobstructive CAD.  He is also had a sleep study which was significant for obstructive sleep apnea for which CPAP has been prescribed but not yet received.  He has little to no awareness of palpitations.  Denies orthopnea PND or lower extremity edema.  He has had recent kidney stone surgery which was uncomplicated.  Since last visit with our service atrial flutter has been added to his problem list for which he was started on Eliquis by interventional cardiology.  He states  "compliance with his current medical regimen reports no significant adverse side effects.    No Known Allergies    Current Outpatient Medications   Medication Instructions    amLODIPine (NORVASC) 10 MG tablet TAKE 1 TABLET BY MOUTH EVERY DAY    metoprolol tartrate (LOPRESSOR) 25 mg, Oral, 2 Times Daily    oxyCODONE-acetaminophen (Percocet)  MG per tablet 1 tablet, Oral, Every 6 Hours PRN    oxyCODONE-acetaminophen (PERCOCET) 7.5-325 MG per tablet 1 tablet, Oral, 2 Times Daily    pravastatin (PRAVACHOL) 40 MG tablet     tamsulosin (FLOMAX) 0.4 MG capsule 24 hr capsule TAKE 1 CAPSULE BY MOUTH EVERY DAY AT BEDTIME FOR 30 DAYS    traZODone (DESYREL) 50 MG tablet TAKE 1 TO 3 TABLETS AT BEDTIME.   Eliquis 5 mg bid    .    Objective:     /60 (BP Location: Left arm, Patient Position: Sitting)   Pulse 52   Ht 182.9 cm (72\")   Wt 95.9 kg (211 lb 6.4 oz)   SpO2 96%   BMI 28.67 kg/m²    Body mass index is 28.67 kg/m².     Constitutional:       Appearance: Well-developed.   Pulmonary:      Effort: Pulmonary effort is normal. No respiratory distress.      Breath sounds: Normal breath sounds. No wheezing. No rales.      Comments: Bases clear  Chest:      Chest wall: Not tender to palpatation.   Cardiovascular:      Normal rate. Regular rhythm.      Murmurs: There is no murmur.      No gallop.  No click. No rub.   Pulses:     Intact distal pulses.   Edema:     Peripheral edema absent.   Musculoskeletal: Normal range of motion.       Lab Review:     Lab Results   Component Value Date    GLUCOSE 122 (H) 02/15/2024    BUN 30 (H) 02/15/2024    CREATININE 2.18 (H) 02/15/2024    EGFRRESULT 61 06/01/2023    EGFR 31.2 (L) 02/15/2024    BCR 13.8 02/15/2024    K 5.0 02/15/2024    CO2 22.7 02/15/2024    CALCIUM 10.1 02/15/2024    PROTENTOTREF 7.2 10/03/2017    ALBUMIN 4.4 02/15/2024    BILITOT 0.4 02/15/2024    AST 20 02/15/2024    ALT 15 02/15/2024     Lab Results   Component Value Date    WBC 10.00 02/15/2024    HGB 14.2 " "02/15/2024    HCT 44.2 02/15/2024    MCV 93.6 02/15/2024     02/15/2024     No results found for: \"TSH\"        Procedures               Assessment:      Diagnosis Plan   1. Chronic anticoagulation  No evidence of atrial fibrillation or flutter.  Discontinue Eliquis      2. PVC's (premature ventricular contractions)  Preserved ejection fraction, no ischemic heart disease by cardiac catheterization.  He remains asymptomatic.  Continue metoprolol at current dose.  Consider repeat monitor study in a year      3. Essential hypertension  Well-managed, continue current medical regimen      4. Atrial flutter, unspecified type  I have reviewed his monitor study as well as entered telemetry strips and EKGs since last visit with our service can find no evidence of atrial flutter.  I have removed atrial flutter from his problem list and discontinued Eliquis      5. NSVT (nonsustained ventricular tachycardia)  See discussion in problem #2      6. ADE (obstructive sleep apnea)  CPAP pending        Plan:     Advance Care Planning   ACP discussion was declined by the patient. Patient does not have an advance directive, declines further assistance.           Stable cardiac status.  Continue current medications.   in 1 year, sooner as needed.  Thank you for allowing us to participate in the care of your patient.     Electronically signed by BASSEM Yancey, 03/04/24, 4:41 PM EST.    "

## 2024-05-13 RX ORDER — ROSUVASTATIN CALCIUM 20 MG/1
20 TABLET, COATED ORAL DAILY
Qty: 90 TABLET | Refills: 1 | OUTPATIENT
Start: 2024-05-13

## 2024-05-15 RX ORDER — ROSUVASTATIN CALCIUM 20 MG/1
20 TABLET, COATED ORAL DAILY
Qty: 90 TABLET | Refills: 1 | OUTPATIENT
Start: 2024-05-15

## 2024-05-22 ENCOUNTER — OFFICE VISIT (OUTPATIENT)
Dept: CARDIOLOGY | Facility: CLINIC | Age: 74
End: 2024-05-22
Payer: MEDICARE

## 2024-05-22 VITALS
OXYGEN SATURATION: 95 % | HEART RATE: 63 BPM | WEIGHT: 227 LBS | HEIGHT: 72 IN | DIASTOLIC BLOOD PRESSURE: 80 MMHG | BODY MASS INDEX: 30.75 KG/M2 | SYSTOLIC BLOOD PRESSURE: 141 MMHG

## 2024-05-22 DIAGNOSIS — I25.10 ASCVD (ARTERIOSCLEROTIC CARDIOVASCULAR DISEASE): ICD-10-CM

## 2024-05-22 DIAGNOSIS — E78.5 DYSLIPIDEMIA: ICD-10-CM

## 2024-05-22 DIAGNOSIS — I10 ESSENTIAL HYPERTENSION: ICD-10-CM

## 2024-05-22 DIAGNOSIS — I49.3 PVC'S (PREMATURE VENTRICULAR CONTRACTIONS): ICD-10-CM

## 2024-05-22 DIAGNOSIS — G47.33 OSA (OBSTRUCTIVE SLEEP APNEA): Primary | ICD-10-CM

## 2024-05-22 RX ORDER — ROSUVASTATIN CALCIUM 20 MG/1
20 TABLET, COATED ORAL DAILY
COMMUNITY
End: 2024-05-22 | Stop reason: SDUPTHER

## 2024-05-22 RX ORDER — ASPIRIN 81 MG/1
81 TABLET, CHEWABLE ORAL DAILY
COMMUNITY

## 2024-05-22 RX ORDER — ROSUVASTATIN CALCIUM 20 MG/1
20 TABLET, COATED ORAL DAILY
Qty: 90 TABLET | Refills: 2 | Status: SHIPPED | OUTPATIENT
Start: 2024-05-22

## 2024-05-22 RX ORDER — DULOXETIN HYDROCHLORIDE 30 MG/1
30 CAPSULE, DELAYED RELEASE ORAL DAILY
COMMUNITY

## 2024-05-22 NOTE — PROGRESS NOTES
[facility]             ClaudineMARK Cohn Jackie D, MD  Williams Lora  1950 05/22/2024    Patient Active Problem List   Diagnosis    Lumbar facet arthropathy    Hx of migraine headaches    Moderate obesity    Insomnia    Essential hypertension    Dyslipidemia    PVC's (premature ventricular contractions)    Prediabetes    Cigarette nicotine dependence in remission    NSVT (nonsustained ventricular tachycardia)    ASCVD (arteriosclerotic cardiovascular disease)    Bilateral kidney stones    Chronic anticoagulation    ADE (obstructive sleep apnea)       Dear Juanis Carreon MD:    Subjective     Chief Complaint   Patient presents with    Follow-up     Routine       HPI:     This is a 73 y.o. male with known past medical history of  essential hypertension, PVCs, tobacco abuse and dyslipidemia.      Williams Lora presents today for routine cardiology follow up. Since patient's last visit to our office he was seen by EP who reviewed patient's EKGs and telemetry strips and found no evidence of atrial flutter therefore Eliquis was discontinued.  He states from a cardiac standpoint he has been doing overall well.  Denies any chest pain or shortness of breath.  Unable to tolerate CPAP.  Blood pressure mildly elevated.  No recent lab work on file    Diagnostic Testing  Abnormal event monitor reading showing multiple runs of ventricular tachycardia, PVCs and atrial flutter  Echocardiogram 6/2023: EF 56 to 60%  Nuclear stress test 6/2023: Mostly reverse [seen at the rest images] and through lateral and inferior wall defect starts at the mid walls extending to the distal walls with slight fixation at the distal inferior wall with normal wall motion the above wall defects are consistent with GI attenuation artifact which has been seen in this study, there was a small reversible apical wall defect this could also related to attenuation artifact versus small  apical ischemia, TID 0.88.   Cardiac event monitor 5/2023: Normal sinus rhythm with frequent PVCs frequent runs of ventricular tachycardia up to 7 beats and possibole atrial flutter  Left heart catheterization 7/2023: Nonobstructive disease     All other systems were reviewed and were negative.    Patient Active Problem List   Diagnosis    Lumbar facet arthropathy    Hx of migraine headaches    Moderate obesity    Insomnia    Essential hypertension    Dyslipidemia    PVC's (premature ventricular contractions)    Prediabetes    Cigarette nicotine dependence in remission    NSVT (nonsustained ventricular tachycardia)    ASCVD (arteriosclerotic cardiovascular disease)    Bilateral kidney stones    Chronic anticoagulation    ADE (obstructive sleep apnea)       family history includes Heart disease in his mother; Pulmonary fibrosis in his father.     reports that he quit smoking about 27 years ago. His smoking use included cigarettes. He started smoking about 47 years ago. He has been exposed to tobacco smoke. He has never used smokeless tobacco. He reports that he does not drink alcohol and does not use drugs.    No Known Allergies      Current Outpatient Medications:     amLODIPine (NORVASC) 10 MG tablet, TAKE 1 TABLET BY MOUTH EVERY DAY, Disp: , Rfl: 3    DULoxetine (CYMBALTA) 30 MG capsule, Take 1 capsule by mouth Daily., Disp: , Rfl:     metoprolol tartrate (LOPRESSOR) 25 MG tablet, TAKE 1 TABLET BY MOUTH TWICE A DAY, Disp: 180 tablet, Rfl: 1    oxyCODONE-acetaminophen (Percocet)  MG per tablet, Take 1 tablet by mouth Every 6 (Six) Hours As Needed for Moderate Pain (Pain)., Disp: 12 tablet, Rfl: 0    rosuvastatin (CRESTOR) 20 MG tablet, Take 1 tablet by mouth Daily., Disp: 90 tablet, Rfl: 2    tamsulosin (FLOMAX) 0.4 MG capsule 24 hr capsule, TAKE 1 CAPSULE BY MOUTH EVERY DAY AT BEDTIME FOR 30 DAYS, Disp: , Rfl:     traZODone (DESYREL) 50 MG tablet, TAKE 1 TO 3 TABLETS AT BEDTIME., Disp: , Rfl: 2    aspirin 81  MG chewable tablet, Chew 1 tablet Daily., Disp: , Rfl:     oxyCODONE-acetaminophen (PERCOCET) 7.5-325 MG per tablet, Take 1 tablet by mouth 2 (Two) Times a Day. (Patient not taking: Reported on 5/22/2024), Disp: , Rfl:       Physical Exam:  I have reviewed the patient's current vital signs as documented in the patient's EMR.   Vitals:    05/22/24 0936   BP: 141/80   Pulse: 63   SpO2: 95%     Body mass index is 30.79 kg/m².       05/22/24  0936   Weight: 103 kg (227 lb)      Physical Exam  Constitutional:       General: He is not in acute distress.     Appearance: Normal appearance. He is well-developed.   HENT:      Head: Normocephalic and atraumatic.      Mouth/Throat:      Mouth: Mucous membranes are moist.   Eyes:      Extraocular Movements: Extraocular movements intact.      Pupils: Pupils are equal, round, and reactive to light.   Neck:      Vascular: No JVD.   Cardiovascular:      Rate and Rhythm: Normal rate and regular rhythm.      Heart sounds: Normal heart sounds. No murmur heard.     No S3 or S4 sounds.   Pulmonary:      Effort: Pulmonary effort is normal. No respiratory distress.      Breath sounds: Normal breath sounds. No wheezing.   Abdominal:      General: Bowel sounds are normal. There is no distension.      Palpations: Abdomen is soft. There is no hepatomegaly.      Tenderness: There is no abdominal tenderness.   Musculoskeletal:         General: Normal range of motion.      Cervical back: Normal range of motion and neck supple.   Skin:     General: Skin is warm and dry.      Coloration: Skin is not jaundiced or pale.   Neurological:      General: No focal deficit present.      Mental Status: He is alert and oriented to person, place, and time. Mental status is at baseline.   Psychiatric:         Mood and Affect: Mood normal.         Behavior: Behavior normal.         Thought Content: Thought content normal.         Judgment: Judgment normal.            DATA REVIEWED:     TTE/ANTONIO:  Results for  "orders placed during the hospital encounter of 06/27/23    Adult Transthoracic Echo Complete w/ Color, Spectral and Contrast if necessary per protocol    Interpretation Summary    Left ventricular systolic function is normal. Left ventricular ejection fraction appears to be 56 - 60%.    Left ventricular diastolic function is consistent with (grade I) impaired relaxation.    The right ventricular cavity is borderline dilated.    The left atrial cavity is mild to moderately dilated.    The right atrial cavity is mildly  dilated.    There is mild calcification of the aortic valve mainly affecting the right coronary cusp(s).    Estimated right ventricular systolic pressure from tricuspid regurgitation is normal (<35 mmHg).      Laboratory evaluations:    Lab Results   Component Value Date    GLUCOSE 122 (H) 02/15/2024    BUN 30 (H) 02/15/2024    CREATININE 2.18 (H) 02/15/2024    EGFRIFNONA 69 02/25/2019    EGFRIFAFRI 76 10/03/2017    BCR 13.8 02/15/2024    K 5.0 02/15/2024    CO2 22.7 02/15/2024    CALCIUM 10.1 02/15/2024    PROTENTOTREF 7.2 10/03/2017    ALBUMIN 4.4 02/15/2024    LABIL2 1.9 10/03/2017    AST 20 02/15/2024    ALT 15 02/15/2024     Lab Results   Component Value Date    WBC 10.00 02/15/2024    HGB 14.2 02/15/2024    HCT 44.2 02/15/2024    MCV 93.6 02/15/2024     02/15/2024     Lab Results   Component Value Date    CHOL 144 07/28/2023    CHLPL 151 06/01/2023    TRIG 100 07/28/2023    HDL 45 07/28/2023    LDL 80 07/28/2023     No results found for: \"TSH\", \"K0FVRZJ\", \"R7IRVAL\", \"THYROIDAB\"  No results found for: \"HGBA1C\"  Lab Results   Component Value Date    ALT 15 02/15/2024     No results found for: \"HGBA1C\"  Lab Results   Component Value Date    CREATININE 2.18 (H) 02/15/2024     No results found for: \"IRON\", \"TIBC\", \"FERRITIN\"  Lab Results   Component Value Date    INR 0.92 06/21/2016    PROTIME 10.0 06/21/2016       "     --------------------------------------------------------------------------------------------------------------------------    ASSESSMENT/PLAN:      Diagnosis Plan   1. ADE (obstructive sleep apnea)        2. PVC's (premature ventricular contractions)        3. Essential hypertension        4. ASCVD (arteriosclerotic cardiovascular disease)        5. Dyslipidemia            ASCVD  Hyperlipidemia  Had recent left heart catheterization which showed nonobstructive coronary artery disease.  Continue aspirin and statin.  Labs requested from PCP to follow-up on LDL.    NSVT  PVCs  ADE  Was taken off of Eliquis due to no documented findings of atrial flutter/fibrillation.  Continues to have PVCs however overall asymptomatic.  Did have home sleep study which showed sleep apnea for which CPAP was recommended however patient declined.      This document has been @Electronically signed by MARK Gilmore, 05/22/24, 8:28 AM EDT.       Dictated Utilizing Dragon Dictation: Part of this note may be an electronic transcription/translation of spoken language to printed text using the Dragon Dictation System.    Follow-up appointment and medication changes provided in hand delivered After Visit Summary as well as reviewed in the room.

## 2024-08-19 ENCOUNTER — HOSPITAL ENCOUNTER (EMERGENCY)
Facility: HOSPITAL | Age: 74
Discharge: HOME | End: 2024-08-19
Attending: STUDENT IN AN ORGANIZED HEALTH CARE EDUCATION/TRAINING PROGRAM | Admitting: STUDENT IN AN ORGANIZED HEALTH CARE EDUCATION/TRAINING PROGRAM
Payer: MEDICARE

## 2024-08-19 ENCOUNTER — APPOINTMENT (OUTPATIENT)
Dept: GENERAL RADIOLOGY | Facility: HOSPITAL | Age: 74
End: 2024-08-19
Payer: MEDICARE

## 2024-08-19 VITALS
OXYGEN SATURATION: 96 % | TEMPERATURE: 98.6 F | BODY MASS INDEX: 31.97 KG/M2 | SYSTOLIC BLOOD PRESSURE: 168 MMHG | RESPIRATION RATE: 18 BRPM | HEIGHT: 72 IN | WEIGHT: 236 LBS | HEART RATE: 72 BPM | DIASTOLIC BLOOD PRESSURE: 96 MMHG

## 2024-08-19 DIAGNOSIS — M25.552 LEFT HIP PAIN: Primary | ICD-10-CM

## 2024-08-19 PROCEDURE — 73502 X-RAY EXAM HIP UNI 2-3 VIEWS: CPT

## 2024-08-19 PROCEDURE — 25010000002 KETOROLAC TROMETHAMINE PER 15 MG: Performed by: STUDENT IN AN ORGANIZED HEALTH CARE EDUCATION/TRAINING PROGRAM

## 2024-08-19 PROCEDURE — 73502 X-RAY EXAM HIP UNI 2-3 VIEWS: CPT | Performed by: RADIOLOGY

## 2024-08-19 PROCEDURE — 25010000002 DEXAMETHASONE SODIUM PHOSPHATE 10 MG/ML SOLUTION: Performed by: STUDENT IN AN ORGANIZED HEALTH CARE EDUCATION/TRAINING PROGRAM

## 2024-08-19 PROCEDURE — 96372 THER/PROPH/DIAG INJ SC/IM: CPT

## 2024-08-19 PROCEDURE — 99283 EMERGENCY DEPT VISIT LOW MDM: CPT

## 2024-08-19 RX ORDER — HYDROCODONE BITARTRATE AND ACETAMINOPHEN 5; 325 MG/1; MG/1
1 TABLET ORAL ONCE
Status: COMPLETED | OUTPATIENT
Start: 2024-08-19 | End: 2024-08-19

## 2024-08-19 RX ORDER — DEXAMETHASONE SODIUM PHOSPHATE 10 MG/ML
10 INJECTION, SOLUTION INTRAMUSCULAR; INTRAVENOUS ONCE
Status: DISCONTINUED | OUTPATIENT
Start: 2024-08-19 | End: 2024-08-19

## 2024-08-19 RX ORDER — DEXAMETHASONE SODIUM PHOSPHATE 10 MG/ML
5 INJECTION, SOLUTION INTRAMUSCULAR; INTRAVENOUS ONCE
Status: DISCONTINUED | OUTPATIENT
Start: 2024-08-19 | End: 2024-08-19

## 2024-08-19 RX ORDER — KETOROLAC TROMETHAMINE 30 MG/ML
30 INJECTION, SOLUTION INTRAMUSCULAR; INTRAVENOUS ONCE
Status: COMPLETED | OUTPATIENT
Start: 2024-08-19 | End: 2024-08-19

## 2024-08-19 RX ORDER — DEXAMETHASONE SODIUM PHOSPHATE 10 MG/ML
5 INJECTION, SOLUTION INTRAMUSCULAR; INTRAVENOUS ONCE
Status: COMPLETED | OUTPATIENT
Start: 2024-08-19 | End: 2024-08-19

## 2024-08-19 RX ADMIN — DEXAMETHASONE SODIUM PHOSPHATE 5 MG: 10 INJECTION INTRAMUSCULAR; INTRAVENOUS at 09:10

## 2024-08-19 RX ADMIN — HYDROCODONE BITARTRATE AND ACETAMINOPHEN 1 TABLET: 5; 325 TABLET ORAL at 09:00

## 2024-08-19 RX ADMIN — KETOROLAC TROMETHAMINE 30 MG: 60 INJECTION, SOLUTION INTRAMUSCULAR at 09:00

## 2024-08-19 NOTE — ED PROVIDER NOTES
Subjective   History of Present Illness  75-year-old male with a past medical history of arthritis, chronic pain, lumbosacral disc disease, and depression presents today with a primary complaint of left hip pain that been present for the past 2 weeks.  No new injury or trauma.  Patient noted symptoms are worsened with ambulation.  Patient notes pain radiates from his left hip into his left femur.  No obvious fever or chills.  No erythematous changes or bruising.  Vitals stable.  Afebrile      Review of Systems   Musculoskeletal:         Left hip pain   All other systems reviewed and are negative.      Past Medical History:   Diagnosis Date    Arthritis     Chronic pain disorder     DDD (degenerative disc disease), lumbar     Depression     RECENT DEATH OF DAUGHTER    Grief     Sudden loss of daughter in law 4 weeks ago    Headache     History of gastritis     History of gout     History of kidney stones     Low back pain     Lumbosacral disc disease     Rheumatic fever     Wears glasses        No Known Allergies    Past Surgical History:   Procedure Laterality Date    BACK SURGERY      CARDIAC CATHETERIZATION N/A 07/28/2023    Procedure: Left Heart Cath (Eliquis instructions per Dr. Fournier);  Surgeon: Roro Blackmon MD;  Location: [facility] CATH INVASIVE LOCATION;  Service: Cardiology;  Laterality: N/A;    CHOLECYSTECTOMY  2001    COLONOSCOPY      CYSTOSCOPY W/ LITHOLAPAXY / EHL  2004    ENDOSCOPY      EXTRACORPOREAL SHOCK WAVE LITHOTRIPSY (ESWL) Right 2/23/2024    Procedure: EXTRACORPOREAL SHOCKWAVE LITHOTRIPSY WITH STENT REMOVAL;  Surgeon: Salvatore Cool MD;  Location: [facility] OR;  Service: Urology;  Laterality: Right;    KIDNEY STONE SURGERY      LUMBAR FUSION  10/2013    posterior lumbar interbody fusion, L4-L5, Dr. Raad Rivera    SPINAL CORD STIMULATOR IMPLANT  07/18/2016    SPINAL CORD STIMULATOR IMPLANT N/A 09/26/2016    Procedure: SPINAL CORD STIMULATOR INSERTION PHASE 1;  Surgeon: Dany Ratliff MD;   Location:  [facility] OR;  Service:     URETEROSCOPY LASER LITHOTRIPSY WITH STENT INSERTION Right 2024    Procedure: URETEROSCOPY  with STENT INSERTION;  Surgeon: Salvatore Cool MD;  Location:  [facility] OR;  Service: Urology;  Laterality: Right;       Family History   Problem Relation Age of Onset    Heart disease Mother     Pulmonary fibrosis Father        Social History     Socioeconomic History    Marital status:    Tobacco Use    Smoking status: Former     Current packs/day: 0.00     Types: Cigarettes     Start date: 1976     Quit date: 1996     Years since quittin.0     Passive exposure: Past    Smokeless tobacco: Never   Vaping Use    Vaping status: Never Used   Substance and Sexual Activity    Alcohol use: No     Comment: Rarely    Drug use: No    Sexual activity: Defer           Objective   Physical Exam  Constitutional:       General: He is not in acute distress.     Appearance: He is well-developed. He is not ill-appearing.   HENT:      Head: Normocephalic and atraumatic.   Eyes:      Extraocular Movements: Extraocular movements intact.      Pupils: Pupils are equal, round, and reactive to light.   Neck:      Vascular: No JVD.   Cardiovascular:      Rate and Rhythm: Normal rate and regular rhythm.      Heart sounds: Normal heart sounds. No murmur heard.  Pulmonary:      Effort: No tachypnea, accessory muscle usage or respiratory distress.      Breath sounds: Normal breath sounds. No stridor. No decreased breath sounds, wheezing, rhonchi or rales.   Chest:      Chest wall: No deformity, tenderness or crepitus.   Abdominal:      General: Bowel sounds are normal.      Palpations: Abdomen is soft.      Tenderness: There is no abdominal tenderness. There is no guarding or rebound.   Musculoskeletal:         General: Normal range of motion.      Cervical back: Normal range of motion and neck supple.      Right lower leg: No tenderness. No edema.      Left lower leg: No tenderness. No  edema.      Comments: Mild tenderness to palpation of the left greater trochanter.    Spinal stimulator in place in the left lower back region   Lymphadenopathy:      Cervical: No cervical adenopathy.   Skin:     General: Skin is warm and dry.      Coloration: Skin is not cyanotic.      Findings: No ecchymosis or erythema.   Neurological:      General: No focal deficit present.      Mental Status: He is alert and oriented to person, place, and time.      Cranial Nerves: No cranial nerve deficit.      Motor: No weakness.   Psychiatric:         Mood and Affect: Mood normal. Mood is not anxious.         Behavior: Behavior normal. Behavior is not agitated.         Procedures           ED Course                                 XR Hip With or Without Pelvis 2 - 3 View Left    Result Date: 8/19/2024  No fracture or dislocation.     This report was finalized on 8/19/2024 8:09 AM by Rea Yadav M.D..                 Medical Decision Making  Plain film imaging of the left hip reveals no acute abnormality.  Likely arthritic changes.    Amount and/or Complexity of Data Reviewed  Radiology: ordered. Decision-making details documented in ED Course.    Risk  Prescription drug management.        Final diagnoses:   Left hip pain       ED Disposition  ED Disposition       ED Disposition   Discharge    Condition   Stable    Comment   --               No follow-up provider specified.       Medication List      No changes were made to your prescriptions during this visit.            Anthony Nielsen DO  08/19/24 0903

## 2024-08-23 ENCOUNTER — APPOINTMENT (OUTPATIENT)
Dept: GENERAL RADIOLOGY | Facility: HOSPITAL | Age: 74
End: 2024-08-23
Payer: MEDICARE

## 2024-08-23 ENCOUNTER — HOSPITAL ENCOUNTER (EMERGENCY)
Facility: HOSPITAL | Age: 74
Discharge: HOME | End: 2024-08-23
Attending: EMERGENCY MEDICINE
Payer: MEDICARE

## 2024-08-23 VITALS
RESPIRATION RATE: 20 BRPM | TEMPERATURE: 97.7 F | WEIGHT: 236 LBS | HEIGHT: 72 IN | OXYGEN SATURATION: 99 % | DIASTOLIC BLOOD PRESSURE: 85 MMHG | SYSTOLIC BLOOD PRESSURE: 149 MMHG | HEART RATE: 89 BPM | BODY MASS INDEX: 31.97 KG/M2

## 2024-08-23 DIAGNOSIS — M16.12 PRIMARY OSTEOARTHRITIS OF LEFT HIP: ICD-10-CM

## 2024-08-23 DIAGNOSIS — M25.552 LEFT HIP PAIN: Primary | ICD-10-CM

## 2024-08-23 PROCEDURE — 99283 EMERGENCY DEPT VISIT LOW MDM: CPT

## 2024-08-23 PROCEDURE — 96375 TX/PRO/DX INJ NEW DRUG ADDON: CPT

## 2024-08-23 PROCEDURE — 25010000002 HYDROMORPHONE PER 4 MG: Performed by: EMERGENCY MEDICINE

## 2024-08-23 PROCEDURE — 72100 X-RAY EXAM L-S SPINE 2/3 VWS: CPT

## 2024-08-23 PROCEDURE — 96374 THER/PROPH/DIAG INJ IV PUSH: CPT

## 2024-08-23 PROCEDURE — 73502 X-RAY EXAM HIP UNI 2-3 VIEWS: CPT

## 2024-08-23 PROCEDURE — 25010000002 KETOROLAC TROMETHAMINE PER 15 MG: Performed by: EMERGENCY MEDICINE

## 2024-08-23 RX ORDER — KETOROLAC TROMETHAMINE 30 MG/ML
15 INJECTION, SOLUTION INTRAMUSCULAR; INTRAVENOUS ONCE
Status: COMPLETED | OUTPATIENT
Start: 2024-08-23 | End: 2024-08-23

## 2024-08-23 RX ORDER — HYDROMORPHONE HYDROCHLORIDE 1 MG/ML
0.5 INJECTION, SOLUTION INTRAMUSCULAR; INTRAVENOUS; SUBCUTANEOUS ONCE
Status: COMPLETED | OUTPATIENT
Start: 2024-08-23 | End: 2024-08-23

## 2024-08-23 RX ADMIN — HYDROMORPHONE HYDROCHLORIDE 0.5 MG: 1 INJECTION, SOLUTION INTRAMUSCULAR; INTRAVENOUS; SUBCUTANEOUS at 17:53

## 2024-08-23 RX ADMIN — KETOROLAC TROMETHAMINE 15 MG: 30 INJECTION, SOLUTION INTRAMUSCULAR; INTRAVENOUS at 17:52

## 2024-08-23 NOTE — ED PROVIDER NOTES
Subjective   History of Present Illness  74-year-old male presents with severe lower left hip pain.  Is been having pain in his left hip going on for about 4 weeks.  He states is not getting any better.  He is on chronic oxycodone tens for chronic pain disorder.  He is also has a spinal stimulator which she has not used in probably for several months to a year.  He had previous back surgeries.  He states this pain is different shoots down the medial portion of his leg states it hurts in the groin movement of the hip increases pain.  He had no falls no trauma.  No loss of bowel or bladder control.  No saddle anesthesia.  No urinary symptoms.    History provided by:  Patient   used: No    Hip Pain  Location:  Left hip medially  Severity:  Severe  Onset quality:  Gradual  Duration:  4 weeks  Timing:  Intermittent  Chronicity:  Recurrent  Context:  Pain worse with movement of the left leg.  Especially internal rotation of the hip.  Relieved by:  Not moving the hip.  Associated symptoms: no abdominal pain, no cough, no diarrhea, no ear pain, no loss of consciousness, no nausea, no rhinorrhea, no sore throat, no vomiting and no wheezing        Review of Systems   HENT:  Negative for ear pain, rhinorrhea and sore throat.    Respiratory:  Negative for cough and wheezing.    Gastrointestinal:  Negative for abdominal pain, diarrhea, nausea and vomiting.   Neurological:  Negative for loss of consciousness.       Past Medical History:   Diagnosis Date   • Arthritis    • Chronic pain disorder    • DDD (degenerative disc disease), lumbar    • Depression     RECENT DEATH OF DAUGHTER   • Grief     Sudden loss of daughter in law 4 weeks ago   • Headache    • History of gastritis    • History of gout    • History of kidney stones    • Low back pain    • Lumbosacral disc disease    • Rheumatic fever    • Wears glasses        No Known Allergies    Past Surgical History:   Procedure Laterality Date   • BACK SURGERY      • CARDIAC CATHETERIZATION N/A 2023    Procedure: Left Heart Cath (Eliquis instructions per Dr. Fournier);  Surgeon: Roro Blackmon MD;  [facility];  Service: Cardiology;  Laterality: N/A;   • CHOLECYSTECTOMY     • COLONOSCOPY     • CYSTOSCOPY W/ LITHOLAPAXY / EHL     • ENDOSCOPY     • EXTRACORPOREAL SHOCK WAVE LITHOTRIPSY (ESWL) Right 2024    Procedure: EXTRACORPOREAL SHOCKWAVE LITHOTRIPSY WITH STENT REMOVAL;  Surgeon: Salvatore Cool MD;  [facility];  Service: Urology;  Laterality: Right;   • KIDNEY STONE SURGERY     • LUMBAR FUSION  10/2013    posterior lumbar interbody fusion, L4-L5, Dr. Raad Rivera   • SPINAL CORD STIMULATOR IMPLANT  2016   • SPINAL CORD STIMULATOR IMPLANT N/A 2016    Procedure: SPINAL CORD STIMULATOR INSERTION PHASE 1;  Surgeon: Dany Ratliff MD;  Location: [facility]placed by [facility];  Service:    • URETEROSCOPY LASER LITHOTRIPSY WITH STENT INSERTION Right 2024    Procedure: URETEROSCOPY  with STENT INSERTION;  Surgeon: Salvatore Cool MD;  [facility];  Service: Urology;  Laterality: Right;       Family History   Problem Relation Age of Onset   • Heart disease Mother    • Pulmonary fibrosis Father        Social History     Socioeconomic History   • Marital status:    Tobacco Use   • Smoking status: Former     Current packs/day: 0.00     Types: Cigarettes     Start date: 1976     Quit date: 1996     Years since quittin.0     Passive exposure: Past   • Smokeless tobacco: Never   Vaping Use   • Vaping status: Never Used   Substance and Sexual Activity   • Alcohol use: No     Comment: Rarely   • Drug use: No   • Sexual activity: Defer           Objective   Physical Exam  Vitals and nursing note reviewed.   Constitutional:       Appearance: He is well-developed.      Comments: Warm pink dry appears very uncomfortable sitting in the chair.  Movement of the left hip increases pain pain over the  greater trochanter to palpation.  There is there is no redness no increased warmth to touch to regional lymphadenitis or lymphadenopathy.  Skin is warm pink and dry distally.  Posterior tib ostracized pedis pulses are palpable cap refill less than 2 seconds.  No tenderness over the knee or ankle.   HENT:      Head: Normocephalic and atraumatic.      Right Ear: External ear normal.      Left Ear: External ear normal.      Nose: Nose normal.   Eyes:      General: No scleral icterus.     Conjunctiva/sclera: Conjunctivae normal.      Pupils: Pupils are equal, round, and reactive to light.   Neck:      Thyroid: No thyromegaly.   Cardiovascular:      Rate and Rhythm: Normal rate and regular rhythm.      Heart sounds: Normal heart sounds.   Pulmonary:      Effort: Pulmonary effort is normal. No respiratory distress.      Breath sounds: Normal breath sounds. No wheezing or rales.   Chest:      Chest wall: No tenderness.   Abdominal:      General: Bowel sounds are normal. There is no distension.      Palpations: Abdomen is soft.      Tenderness: There is no abdominal tenderness.   Musculoskeletal:         General: Normal range of motion.      Cervical back: Normal range of motion.   Lymphadenopathy:      Cervical: No cervical adenopathy.   Skin:     General: Skin is warm and dry.   Neurological:      Mental Status: He is alert and oriented to person, place, and time.      Cranial Nerves: No cranial nerve deficit.      Coordination: Coordination normal.      Deep Tendon Reflexes: Reflexes are normal and symmetric. Reflexes normal.   Psychiatric:         Behavior: Behavior normal.         Thought Content: Thought content normal.         Judgment: Judgment normal.       Procedures       No results found for this or any previous visit (from the past 24 hour(s)).  Note: In addition to lab results from this visit, the labs listed above may include labs taken at another facility or during a different encounter within the last 24  "hours. Please correlate lab times with ED admission and discharge times for further clarification of the services performed during this visit.    XR Hip With or Without Pelvis 2 - 3 View Left   Final Result   Impression:   1.No acute or osseous abnormality is identified on lumbar spine or left hip x-rays.   2.Arthritic narrowing of both hip joints.   3.Multilevel moderate degenerative changes in the lumbar spine. Postoperative changes from posterior fusion at L4-L5.   4.Suspected persistent calculus in the mid right ureter and bilateral renal calculi, when correlated with 2/15/2024 CT.         Electronically Signed: Bailey Helm     8/23/2024 5:57 PM EDT     Workstation ID: [id number]      XR Spine Lumbar 2 or 3 View   Final Result   Impression:   1.No acute or osseous abnormality is identified on lumbar spine or left hip x-rays.   2.Arthritic narrowing of both hip joints.   3.Multilevel moderate degenerative changes in the lumbar spine. Postoperative changes from posterior fusion at L4-L5.   4.Suspected persistent calculus in the mid right ureter and bilateral renal calculi, when correlated with 2/15/2024 CT.         Electronically Signed: Bailey Helm     8/23/2024 5:57 PM EDT     Workstation ID: [id number]        Vitals:    08/23/24 1635   BP: 149/85   BP Location: Left arm   Patient Position: Sitting   Pulse: 89   Resp: 20   Temp: 97.7 °F (36.5 °C)   TempSrc: Oral   SpO2: 99%   Weight: 107 kg (236 lb)   Height: 182.9 cm (72\")     Medications   ketorolac (TORADOL) injection 15 mg (15 mg Intravenous Given 8/23/24 1752)   HYDROmorphone (DILAUDID) injection 0.5 mg (0.5 mg Intravenous Given 8/23/24 1753)     ECG/EMG Results (last 24 hours)       ** No results found for the last 24 hours. **          No orders to display           ED Course  ED Course as of 08/23/24 1835   Fri Aug 23, 2024   1834 Strays are negative for any thing acute.  Chronic degenerative changes noted.  He is to follow-up with his primary care " doctor he is has chronic narcotics which she has at home and will continue these.  He is currently on steroids as well. [JUANITA]   1834 He did get significant relief from pain medication. [JUANITA]      ED Course User Index  [JUANITA] Leonardo Kowalski PA                                             Medical Decision Making  Problems Addressed:  Left hip pain: complicated acute illness or injury  Primary osteoarthritis of left hip: complicated acute illness or injury    Amount and/or Complexity of Data Reviewed  Radiology: ordered.    Risk  Prescription drug management.        Final diagnoses:   Left hip pain   Primary osteoarthritis of left hip       ED Disposition  ED Disposition       ED Disposition   Discharge    Condition   Stable    Comment   --               Alex Arndt MD  [address] [id number] [phone]      Call for appointment         Medication List      No changes were made to your prescriptions during this visit.            Leonardo Kowalski PA  08/24/24 1936

## 2024-09-03 ENCOUNTER — TELEPHONE (OUTPATIENT)
Dept: CARDIOLOGY | Facility: CLINIC | Age: 74
End: 2024-09-03
Payer: MEDICARE

## 2024-09-04 NOTE — TELEPHONE ENCOUNTER
Cardiac risk assessment for total hip replacement.     LS 5/22/2024  F/up 11/19/2024  Stress 6/27/23  Echo 6/27/2023     Form on Claudine's desk.

## 2024-09-09 ENCOUNTER — TELEPHONE (OUTPATIENT)
Dept: NEUROSURGERY | Facility: CLINIC | Age: 74
End: 2024-09-09
Payer: MEDICARE

## 2024-09-09 NOTE — TELEPHONE ENCOUNTER
CALLER: [facility]/ [facility]    PATIENT: KERRY HERNANDEZ    REASON FOR CALL: THE LAST OVN FROM 10/12/16 W/ KATIA PAUL    FAX # [phone]    CALL BACK IF THERE ARE ANY QUESTION @ [phone],

## 2024-09-12 NOTE — TELEPHONE ENCOUNTER
Mary Kay at [facility] returned my call. The fax number is [phone]. Office note faxed.

## (undated) DEVICE — MODEL AT P65, P/N 701554-001KIT CONTENTS: HAND CONTROLLER, 3-WAY HIGH-PRESSURE STOPCOCK WITH ROTATING END AND PREMIUM HIGH-PRESSURE TUBING: Brand: ANGIOTOUCH® KIT

## (undated) DEVICE — DRSNG SURESITE WNDW 4X4.5

## (undated) DEVICE — CATH F5 INF 3DRC 100CM: Brand: INFINITI

## (undated) DEVICE — GLIDESHEATH SLENDER STAINLESS STEEL KIT: Brand: GLIDESHEATH SLENDER

## (undated) DEVICE — ADULT DISPOSABLE SINGLE-PATIENT USE PULSE OXIMETER SENSOR: Brand: NONIN

## (undated) DEVICE — Device

## (undated) DEVICE — CATH VENT MIV RADL PIG LNG TIP 5F 110CM

## (undated) DEVICE — PK CYSTO 70

## (undated) DEVICE — DRAPE, RADIAL, STERILE: Brand: MEDLINE

## (undated) DEVICE — COR CYSTO: Brand: MEDLINE INDUSTRIES, INC.

## (undated) DEVICE — RUNWAY RADL W/TOP PAD

## (undated) DEVICE — RADIFOCUS OPTITORQUE ANGIOGRAPHIC CATHETER: Brand: OPTITORQUE

## (undated) DEVICE — GW INQW FIX/CORE PTFE J/3MM .035 260CM

## (undated) DEVICE — A2000 MULTI-USE SYRINGE KIT, P/N 701277-003KIT CONTENTS: 100ML CONTRAST RESERVOIR AND TUBING WITH CONTRAST SPIKE AND CLAMP: Brand: A2000 MULTI-USE SYRINGE KIT

## (undated) DEVICE — PK CATH CARD 70

## (undated) DEVICE — LN FLTR ORL/NASL MICROSTREAM NONINTUB A/LNG

## (undated) DEVICE — GW ZIPWIRE STD/SHFT STR TPR/3CM .035IN 150CM

## (undated) DEVICE — ST INF PRI SMRTSTE 20DRP 2VLV 24ML 117

## (undated) DEVICE — PAD, DEFIB, ADULT, RADIOTRANS, ZOLL: Brand: MEDLINE

## (undated) DEVICE — ST EXT IV SMRTSTE 2VLV FIX M LL 6ML 41